# Patient Record
Sex: MALE | Race: WHITE | Employment: FULL TIME | URBAN - METROPOLITAN AREA
[De-identification: names, ages, dates, MRNs, and addresses within clinical notes are randomized per-mention and may not be internally consistent; named-entity substitution may affect disease eponyms.]

---

## 2018-08-30 ENCOUNTER — LAB ENCOUNTER (OUTPATIENT)
Dept: LAB | Age: 53
End: 2018-08-30
Attending: INTERNAL MEDICINE
Payer: COMMERCIAL

## 2018-08-30 ENCOUNTER — OFFICE VISIT (OUTPATIENT)
Dept: INTERNAL MEDICINE CLINIC | Facility: CLINIC | Age: 53
End: 2018-08-30
Payer: COMMERCIAL

## 2018-08-30 VITALS
BODY MASS INDEX: 28.04 KG/M2 | DIASTOLIC BLOOD PRESSURE: 82 MMHG | SYSTOLIC BLOOD PRESSURE: 110 MMHG | HEART RATE: 61 BPM | WEIGHT: 185 LBS | RESPIRATION RATE: 16 BRPM | TEMPERATURE: 99 F | HEIGHT: 68 IN

## 2018-08-30 DIAGNOSIS — Z13.220 LIPID SCREENING: ICD-10-CM

## 2018-08-30 DIAGNOSIS — Z01.84 IMMUNITY STATUS TESTING: ICD-10-CM

## 2018-08-30 DIAGNOSIS — Z13.29 THYROID DISORDER SCREEN: ICD-10-CM

## 2018-08-30 DIAGNOSIS — Z12.5 PROSTATE CANCER SCREENING: ICD-10-CM

## 2018-08-30 DIAGNOSIS — Z12.11 COLON CANCER SCREENING: ICD-10-CM

## 2018-08-30 DIAGNOSIS — H53.9 VISION CHANGES: ICD-10-CM

## 2018-08-30 DIAGNOSIS — Z13.0 SCREENING, ANEMIA, DEFICIENCY, IRON: ICD-10-CM

## 2018-08-30 DIAGNOSIS — M26.629 TMJ SYNDROME: ICD-10-CM

## 2018-08-30 DIAGNOSIS — Z13.89 SCREENING FOR GENITOURINARY CONDITION: ICD-10-CM

## 2018-08-30 DIAGNOSIS — Z00.00 LABORATORY EXAMINATION ORDERED AS PART OF A ROUTINE GENERAL MEDICAL EXAMINATION: ICD-10-CM

## 2018-08-30 DIAGNOSIS — Z00.00 PHYSICAL EXAM, ANNUAL: Primary | ICD-10-CM

## 2018-08-30 PROBLEM — M50.20 CERVICAL DISC HERNIATION: Status: ACTIVE | Noted: 2018-08-30

## 2018-08-30 LAB
ALBUMIN SERPL-MCNC: 4 G/DL (ref 3.5–4.8)
ALBUMIN/GLOB SERPL: 1.1 {RATIO} (ref 1–2)
ALP LIVER SERPL-CCNC: 66 U/L (ref 45–117)
ALT SERPL-CCNC: 21 U/L (ref 17–63)
ANION GAP SERPL CALC-SCNC: 4 MMOL/L (ref 0–18)
AST SERPL-CCNC: 15 U/L (ref 15–41)
BASOPHILS # BLD AUTO: 0.06 X10(3) UL (ref 0–0.1)
BASOPHILS NFR BLD AUTO: 0.8 %
BILIRUB SERPL-MCNC: 1.4 MG/DL (ref 0.1–2)
BUN BLD-MCNC: 13 MG/DL (ref 8–20)
BUN/CREAT SERPL: 13.1 (ref 10–20)
CALCIUM BLD-MCNC: 8.8 MG/DL (ref 8.3–10.3)
CHLORIDE SERPL-SCNC: 106 MMOL/L (ref 101–111)
CHOLEST SMN-MCNC: 191 MG/DL (ref ?–200)
CO2 SERPL-SCNC: 30 MMOL/L (ref 22–32)
COMPLEXED PSA SERPL-MCNC: 0.26 NG/ML (ref 0.01–4)
CREAT BLD-MCNC: 0.99 MG/DL (ref 0.7–1.3)
EOSINOPHIL # BLD AUTO: 0.29 X10(3) UL (ref 0–0.3)
EOSINOPHIL NFR BLD AUTO: 4.1 %
ERYTHROCYTE [DISTWIDTH] IN BLOOD BY AUTOMATED COUNT: 13 % (ref 11.5–16)
GLOBULIN PLAS-MCNC: 3.7 G/DL (ref 2.5–4)
GLUCOSE BLD-MCNC: 79 MG/DL (ref 70–99)
HCT VFR BLD AUTO: 46.4 % (ref 37–53)
HCV AB SERPL QL IA: NONREACTIVE
HDLC SERPL-MCNC: 37 MG/DL (ref 40–59)
HGB BLD-MCNC: 15.8 G/DL (ref 13–17)
IMMATURE GRANULOCYTE COUNT: 0.02 X10(3) UL (ref 0–1)
IMMATURE GRANULOCYTE RATIO %: 0.3 %
LDLC SERPL CALC-MCNC: 109 MG/DL (ref ?–100)
LYMPHOCYTES # BLD AUTO: 2.43 X10(3) UL (ref 0.9–4)
LYMPHOCYTES NFR BLD AUTO: 34.2 %
M PROTEIN MFR SERPL ELPH: 7.7 G/DL (ref 6.1–8.3)
MCH RBC QN AUTO: 29.4 PG (ref 27–33.2)
MCHC RBC AUTO-ENTMCNC: 34.1 G/DL (ref 31–37)
MCV RBC AUTO: 86.2 FL (ref 80–99)
MONOCYTES # BLD AUTO: 0.47 X10(3) UL (ref 0.1–1)
MONOCYTES NFR BLD AUTO: 6.6 %
NEUTROPHIL ABS PRELIM: 3.84 X10 (3) UL (ref 1.3–6.7)
NEUTROPHILS # BLD AUTO: 3.84 X10(3) UL (ref 1.3–6.7)
NEUTROPHILS NFR BLD AUTO: 54 %
NONHDLC SERPL-MCNC: 154 MG/DL (ref ?–130)
OSMOLALITY SERPL CALC.SUM OF ELEC: 289 MOSM/KG (ref 275–295)
PLATELET # BLD AUTO: 293 10(3)UL (ref 150–450)
POTASSIUM SERPL-SCNC: 3.7 MMOL/L (ref 3.6–5.1)
RBC # BLD AUTO: 5.38 X10(6)UL (ref 4.3–5.7)
RED CELL DISTRIBUTION WIDTH-SD: 40.2 FL (ref 35.1–46.3)
SODIUM SERPL-SCNC: 140 MMOL/L (ref 136–144)
TRIGL SERPL-MCNC: 224 MG/DL (ref 30–149)
TSI SER-ACNC: 1.21 MIU/ML (ref 0.35–5.5)
VLDLC SERPL CALC-MCNC: 45 MG/DL (ref 0–30)
WBC # BLD AUTO: 7.1 X10(3) UL (ref 4–13)

## 2018-08-30 PROCEDURE — 83036 HEMOGLOBIN GLYCOSYLATED A1C: CPT | Performed by: INTERNAL MEDICINE

## 2018-08-30 PROCEDURE — 80061 LIPID PANEL: CPT | Performed by: INTERNAL MEDICINE

## 2018-08-30 PROCEDURE — 99396 PREV VISIT EST AGE 40-64: CPT | Performed by: INTERNAL MEDICINE

## 2018-08-30 PROCEDURE — 36415 COLL VENOUS BLD VENIPUNCTURE: CPT | Performed by: INTERNAL MEDICINE

## 2018-08-30 PROCEDURE — 84153 ASSAY OF PSA TOTAL: CPT | Performed by: INTERNAL MEDICINE

## 2018-08-30 PROCEDURE — 80050 GENERAL HEALTH PANEL: CPT | Performed by: INTERNAL MEDICINE

## 2018-08-30 PROCEDURE — 86803 HEPATITIS C AB TEST: CPT | Performed by: INTERNAL MEDICINE

## 2018-08-30 PROCEDURE — 81003 URINALYSIS AUTO W/O SCOPE: CPT | Performed by: INTERNAL MEDICINE

## 2018-08-30 RX ORDER — SIMVASTATIN 20 MG
20 TABLET ORAL NIGHTLY
COMMUNITY

## 2018-08-30 NOTE — PATIENT INSTRUCTIONS
Prevention Guidelines, Men Ages 48 to 59  Screening tests and vaccines are an important part of managing your health. A screening test is done to find possible disorders or diseases in people who don't have any symptoms.  The goal is to find a disease ear High cholesterol or triglycerides All men in this age group At least every 5 years   HIV Men at increased risk for infection – talk with your healthcare provider At routine exams   Lung cancer Adults age 54 to [de-identified] who have smoked Yearly screening in smokers Pneumococcal conjugate vaccine (PCV13) and pneumococcal polysaccharide vaccine (PPSV23) Men at increased risk for infection – talk with your healthcare provider PCV13: 1 dose ages 23 to 72 (protects against 13 types of pneumococcal bacteria)     PPSV23: 1 Admission

## 2018-08-30 NOTE — PROGRESS NOTES
HPI:    Patient ID: Sole Banks is a 47year old male. HPI  Sole Banks is a 47year old male who presents for a complete physical exam.   HPI:   Pt complains of right jaw pain x 3-4 m.  Went to dentist thinking wisdom tooth impac mouth every morning before breakfast. As needed  Disp:  Rfl:    LORazepam (ATIVAN) 2 MG Oral Tab Take 1 tablet by mouth 3 (three) times daily as needed for Anxiety. Disp: 90 tablet Rfl: 1   Finasteride 1 MG Oral Tab Take 1 mg by mouth daily.  Disp:  Rfl: distress  SKIN: no rashes,no suspicious lesions  HEENT: atraumatic, normocephalic,ears and throat are clear, tender and swelling at right tmj, no mass  EYES:PERRLA, EOMI, normal optic disk,conjunctiva are clear  NECK: supple,no adenopathy,no bruits  CHEST: Disp:  Rfl:    LORazepam (ATIVAN) 2 MG Oral Tab Take 1 tablet by mouth 3 (three) times daily as needed for Anxiety. Disp: 90 tablet Rfl: 1   Finasteride 1 MG Oral Tab Take 1 mg by mouth daily.  Disp:  Rfl:    aspirin 81 MG Oral Tab Take 1 tablet by mouth da

## 2018-08-31 ENCOUNTER — NURSE ONLY (OUTPATIENT)
Dept: INTERNAL MEDICINE CLINIC | Facility: CLINIC | Age: 53
End: 2018-08-31

## 2018-08-31 LAB
BILIRUB UR QL STRIP.AUTO: NEGATIVE
COLOR UR AUTO: YELLOW
EST. AVERAGE GLUCOSE BLD GHB EST-MCNC: 100 MG/DL (ref 68–126)
GLUCOSE UR STRIP.AUTO-MCNC: NEGATIVE MG/DL
HBA1C MFR BLD HPLC: 5.1 % (ref ?–5.7)
KETONES UR STRIP.AUTO-MCNC: NEGATIVE MG/DL
LEUKOCYTE ESTERASE UR QL STRIP.AUTO: NEGATIVE
NITRITE UR QL STRIP.AUTO: NEGATIVE
PH UR STRIP.AUTO: 5 [PH] (ref 4.5–8)
PROT UR STRIP.AUTO-MCNC: NEGATIVE MG/DL
RBC UR QL AUTO: NEGATIVE
SP GR UR STRIP.AUTO: 1.02 (ref 1–1.03)
UROBILINOGEN UR STRIP.AUTO-MCNC: <2 MG/DL

## 2018-08-31 NOTE — PROGRESS NOTES
Bilateral ear irrigation successfully performed. Pt tolerated irrigation well. A copy of the pt's Wellness Screening form was given to the pt upon request. A copy was sent to scan.

## 2018-11-02 PROBLEM — R14.1 FLATULENCE, ERUCTATION, AND GAS PAIN: Status: ACTIVE | Noted: 2018-11-02

## 2018-11-02 PROBLEM — Z80.0 FAMILY HISTORY OF MALIGNANT NEOPLASM OF GASTROINTESTINAL TRACT: Status: ACTIVE | Noted: 2018-11-02

## 2018-11-02 PROBLEM — Z86.0100 PERSONAL HISTORY OF COLONIC POLYPS: Status: ACTIVE | Noted: 2018-11-02

## 2018-11-02 PROBLEM — R14.3 FLATULENCE, ERUCTATION, AND GAS PAIN: Status: ACTIVE | Noted: 2018-11-02

## 2018-11-02 PROBLEM — R12 HEARTBURN: Status: ACTIVE | Noted: 2018-11-02

## 2018-11-02 PROBLEM — D12.2 BENIGN NEOPLASM OF ASCENDING COLON: Status: ACTIVE | Noted: 2018-11-02

## 2018-11-02 PROBLEM — Z83.71 FAMILY HISTORY OF COLONIC POLYPS: Status: ACTIVE | Noted: 2018-11-02

## 2018-11-02 PROBLEM — R14.2 FLATULENCE, ERUCTATION, AND GAS PAIN: Status: ACTIVE | Noted: 2018-11-02

## 2018-11-02 PROBLEM — Z83.719 FAMILY HISTORY OF COLONIC POLYPS: Status: ACTIVE | Noted: 2018-11-02

## 2018-11-02 PROBLEM — Z86.010 PERSONAL HISTORY OF COLONIC POLYPS: Status: ACTIVE | Noted: 2018-11-02

## 2019-04-03 ENCOUNTER — OFFICE VISIT (OUTPATIENT)
Dept: INTERNAL MEDICINE CLINIC | Facility: CLINIC | Age: 54
End: 2019-04-03
Payer: COMMERCIAL

## 2019-04-03 VITALS
WEIGHT: 192 LBS | HEIGHT: 68 IN | HEART RATE: 74 BPM | SYSTOLIC BLOOD PRESSURE: 112 MMHG | TEMPERATURE: 98 F | DIASTOLIC BLOOD PRESSURE: 70 MMHG | BODY MASS INDEX: 29.1 KG/M2 | RESPIRATION RATE: 16 BRPM

## 2019-04-03 DIAGNOSIS — H81.11 BPPV (BENIGN PAROXYSMAL POSITIONAL VERTIGO), RIGHT: Primary | ICD-10-CM

## 2019-04-03 PROCEDURE — 99213 OFFICE O/P EST LOW 20 MIN: CPT | Performed by: PHYSICIAN ASSISTANT

## 2019-04-03 RX ORDER — PREDNISONE 20 MG/1
20 TABLET ORAL DAILY
Qty: 7 TABLET | Refills: 0 | Status: SHIPPED | OUTPATIENT
Start: 2019-04-03 | End: 2019-04-10

## 2019-04-03 RX ORDER — MECLIZINE HYDROCHLORIDE 25 MG/1
25 TABLET ORAL 3 TIMES DAILY PRN
Qty: 21 TABLET | Refills: 0 | Status: SHIPPED | OUTPATIENT
Start: 2019-04-03 | End: 2019-09-05 | Stop reason: ALTCHOICE

## 2019-04-03 NOTE — PROGRESS NOTES
HPI:    Patient ID: Abbi Hart is a 47year old male. Patient presents with:  Dizziness: started last thursday, dizziness, nausea, pt states he only feels dizzy at ngiht while he is laying down     Dizziness   This is a new problem.  The vonda 3350-KCl-NaBcb-NaCl-NaSulf (PEG 3350/ELECTROLYTES) 240 g Oral Recon Soln Take as directed by physician. Disp: 1 Bottle Rfl: 0   simvastatin 20 MG Oral Tab Take 20 mg by mouth nightly.  Disp:  Rfl:    Multiple Vitamins-Minerals (CENTRUM SILVER ULTRA MENS) Or He is not diaphoretic. Psychiatric: He has a normal mood and affect.        /78   Pulse 74   Temp 98.4 °F (36.9 °C) (Oral)   Resp 16   Ht 68\"   Wt 192 lb   BMI 29.19 kg/m²           ASSESSMENT/PLAN:   Bppv (benign paroxysmal positional vertigo), ri

## 2019-04-03 NOTE — PATIENT INSTRUCTIONS
HOME TREATMENT OF BPPV:  HADLEY-DAROFF EXERCISES    The Hadley-Daroff Exercises are a home method of treating BPPV, usually used when the side of BPPV is unclear.  Their use has been declining in recent years, as the home Epley maneuver (see below) is c These exercises should be performed for two weeks, three times per day, or for three weeks, twice per day. This adds up to 42 sets in total. In most persons, complete relief from symptoms is obtained after 30 sets, or about 10 days.  In approximately 30 per Causes include injury to your head or neck. Other problems with the vestibular system may cause BPPV. In many people, the cause of BPPV is not known. Symptoms of BPPV  You many have repeated feelings of spinning (vertigo).  The vertigo usually lasts less t © 4594-5989 [employer] [address]. [address] [facility] [place], [facility] [place]. All rights reserved. This information is not intended as a substitute for professional medical care. Always follow your healthcare professional's instructions.

## 2019-09-05 ENCOUNTER — APPOINTMENT (OUTPATIENT)
Dept: LAB | Age: 54
End: 2019-09-05
Attending: INTERNAL MEDICINE
Payer: COMMERCIAL

## 2019-09-05 ENCOUNTER — OFFICE VISIT (OUTPATIENT)
Dept: INTERNAL MEDICINE CLINIC | Facility: CLINIC | Age: 54
End: 2019-09-05
Payer: COMMERCIAL

## 2019-09-05 VITALS
RESPIRATION RATE: 16 BRPM | WEIGHT: 194 LBS | DIASTOLIC BLOOD PRESSURE: 82 MMHG | HEIGHT: 68 IN | SYSTOLIC BLOOD PRESSURE: 134 MMHG | TEMPERATURE: 98 F | BODY MASS INDEX: 29.4 KG/M2 | HEART RATE: 92 BPM

## 2019-09-05 DIAGNOSIS — Z00.00 ANNUAL PHYSICAL EXAM: Primary | ICD-10-CM

## 2019-09-05 DIAGNOSIS — Z13.89 SCREENING FOR GENITOURINARY CONDITION: ICD-10-CM

## 2019-09-05 DIAGNOSIS — Z13.0 SCREENING, IRON DEFICIENCY ANEMIA: ICD-10-CM

## 2019-09-05 DIAGNOSIS — M77.11 LATERAL EPICONDYLITIS OF RIGHT ELBOW: ICD-10-CM

## 2019-09-05 DIAGNOSIS — Z00.00 LABORATORY EXAMINATION ORDERED AS PART OF A ROUTINE GENERAL MEDICAL EXAMINATION: ICD-10-CM

## 2019-09-05 DIAGNOSIS — Z13.29 THYROID DISORDER SCREEN: ICD-10-CM

## 2019-09-05 DIAGNOSIS — Z12.5 PROSTATE CANCER SCREENING: ICD-10-CM

## 2019-09-05 DIAGNOSIS — Z13.220 LIPID SCREENING: ICD-10-CM

## 2019-09-05 LAB
ALBUMIN SERPL-MCNC: 4.1 G/DL (ref 3.4–5)
ALBUMIN/GLOB SERPL: 1.2 {RATIO} (ref 1–2)
ALP LIVER SERPL-CCNC: 61 U/L (ref 45–117)
ALT SERPL-CCNC: 24 U/L (ref 16–61)
ANION GAP SERPL CALC-SCNC: 5 MMOL/L (ref 0–18)
AST SERPL-CCNC: 19 U/L (ref 15–37)
BASOPHILS # BLD AUTO: 0.05 X10(3) UL (ref 0–0.2)
BASOPHILS NFR BLD AUTO: 0.7 %
BILIRUB SERPL-MCNC: 1.8 MG/DL (ref 0.1–2)
BILIRUB UR QL STRIP.AUTO: NEGATIVE
BUN BLD-MCNC: 9 MG/DL (ref 7–18)
BUN/CREAT SERPL: 9.1 (ref 10–20)
CALCIUM BLD-MCNC: 8.4 MG/DL (ref 8.5–10.1)
CHLORIDE SERPL-SCNC: 105 MMOL/L (ref 98–112)
CHOLEST SMN-MCNC: 151 MG/DL (ref ?–200)
CLARITY UR REFRACT.AUTO: CLEAR
CO2 SERPL-SCNC: 30 MMOL/L (ref 21–32)
COLOR UR AUTO: YELLOW
CREAT BLD-MCNC: 0.99 MG/DL (ref 0.7–1.3)
DEPRECATED RDW RBC AUTO: 43.4 FL (ref 35.1–46.3)
EOSINOPHIL # BLD AUTO: 0.21 X10(3) UL (ref 0–0.7)
EOSINOPHIL NFR BLD AUTO: 3 %
ERYTHROCYTE [DISTWIDTH] IN BLOOD BY AUTOMATED COUNT: 13.3 % (ref 11–15)
EST. AVERAGE GLUCOSE BLD GHB EST-MCNC: 108 MG/DL (ref 68–126)
GLOBULIN PLAS-MCNC: 3.3 G/DL (ref 2.8–4.4)
GLUCOSE BLD-MCNC: 88 MG/DL (ref 70–99)
GLUCOSE UR STRIP.AUTO-MCNC: NEGATIVE MG/DL
HBA1C MFR BLD HPLC: 5.4 % (ref ?–5.7)
HCT VFR BLD AUTO: 44.9 % (ref 39–53)
HDLC SERPL-MCNC: 38 MG/DL (ref 40–59)
HGB BLD-MCNC: 14.8 G/DL (ref 13–17.5)
IMM GRANULOCYTES # BLD AUTO: 0.02 X10(3) UL (ref 0–1)
IMM GRANULOCYTES NFR BLD: 0.3 %
LDLC SERPL CALC-MCNC: 83 MG/DL (ref ?–100)
LEUKOCYTE ESTERASE UR QL STRIP.AUTO: NEGATIVE
LYMPHOCYTES # BLD AUTO: 1.8 X10(3) UL (ref 1–4)
LYMPHOCYTES NFR BLD AUTO: 25.8 %
M PROTEIN MFR SERPL ELPH: 7.4 G/DL (ref 6.4–8.2)
MCH RBC QN AUTO: 29.1 PG (ref 26–34)
MCHC RBC AUTO-ENTMCNC: 33 G/DL (ref 31–37)
MCV RBC AUTO: 88.4 FL (ref 80–100)
MONOCYTES # BLD AUTO: 0.46 X10(3) UL (ref 0.1–1)
MONOCYTES NFR BLD AUTO: 6.6 %
NEUTROPHILS # BLD AUTO: 4.44 X10 (3) UL (ref 1.5–7.7)
NEUTROPHILS # BLD AUTO: 4.44 X10(3) UL (ref 1.5–7.7)
NEUTROPHILS NFR BLD AUTO: 63.6 %
NITRITE UR QL STRIP.AUTO: NEGATIVE
NONHDLC SERPL-MCNC: 113 MG/DL (ref ?–130)
OSMOLALITY SERPL CALC.SUM OF ELEC: 288 MOSM/KG (ref 275–295)
PH UR STRIP.AUTO: 6 [PH] (ref 4.5–8)
PLATELET # BLD AUTO: 323 10(3)UL (ref 150–450)
POTASSIUM SERPL-SCNC: 3.9 MMOL/L (ref 3.5–5.1)
PROT UR STRIP.AUTO-MCNC: NEGATIVE MG/DL
PSA SERPL-MCNC: 0.2 NG/ML (ref ?–4)
RBC # BLD AUTO: 5.08 X10(6)UL (ref 4.3–5.7)
RBC UR QL AUTO: NEGATIVE
SODIUM SERPL-SCNC: 140 MMOL/L (ref 136–145)
SP GR UR STRIP.AUTO: 1.02 (ref 1–1.03)
TRIGL SERPL-MCNC: 152 MG/DL (ref 30–149)
TSI SER-ACNC: 1.52 MIU/ML (ref 0.36–3.74)
UROBILINOGEN UR STRIP.AUTO-MCNC: <2 MG/DL
VLDLC SERPL CALC-MCNC: 30 MG/DL (ref 0–30)
WBC # BLD AUTO: 7 X10(3) UL (ref 4–11)

## 2019-09-05 PROCEDURE — 85025 COMPLETE CBC W/AUTO DIFF WBC: CPT | Performed by: INTERNAL MEDICINE

## 2019-09-05 PROCEDURE — 99396 PREV VISIT EST AGE 40-64: CPT | Performed by: INTERNAL MEDICINE

## 2019-09-05 PROCEDURE — 84443 ASSAY THYROID STIM HORMONE: CPT | Performed by: INTERNAL MEDICINE

## 2019-09-05 PROCEDURE — 80061 LIPID PANEL: CPT | Performed by: INTERNAL MEDICINE

## 2019-09-05 PROCEDURE — 36415 COLL VENOUS BLD VENIPUNCTURE: CPT | Performed by: INTERNAL MEDICINE

## 2019-09-05 PROCEDURE — 81003 URINALYSIS AUTO W/O SCOPE: CPT | Performed by: INTERNAL MEDICINE

## 2019-09-05 PROCEDURE — 83036 HEMOGLOBIN GLYCOSYLATED A1C: CPT | Performed by: INTERNAL MEDICINE

## 2019-09-05 PROCEDURE — 80053 COMPREHEN METABOLIC PANEL: CPT | Performed by: INTERNAL MEDICINE

## 2019-09-05 PROCEDURE — 84153 ASSAY OF PSA TOTAL: CPT | Performed by: INTERNAL MEDICINE

## 2019-09-05 NOTE — PROGRESS NOTES
HPI:    Patient ID: Bladimir Ivory is a 49year old male. HPI  Bladimir Ivory is a 49year old male who presents for a complete physical exam.   HPI:   Pt complains of right lateral elbow for months  No trauma.  Worse with lifting  Othe Release Take 20 mg by mouth every morning before breakfast. As needed  Disp:  Rfl:    LORazepam (ATIVAN) 2 MG Oral Tab Take 1 tablet by mouth 3 (three) times daily as needed for Anxiety.  Disp: 90 tablet Rfl: 1   Finasteride 1 MG Oral Tab Take 1 mg by mouth well otherwise  SKIN: denies any unusual skin lesions  EYES:denies blurred vision or double vision  HEENT: denies nasal congestion, sinus pain or ST  LUNGS: denies shortness of breath with exertion  CARDIOVASCULAR: denies chest pain on exertion  GI: denies nightly. Disp:  Rfl:    Multiple Vitamins-Minerals (CENTRUM SILVER ULTRA MENS) Oral Tab Take by mouth. Disp:  Rfl:    Cholecalciferol (VITAMIN D3) 2000 UNITS Oral Tab Take by mouth daily.  Disp:  Rfl:    escitalopram (LEXAPRO) 10 MG Oral Tab Take 1 tablet (

## 2019-09-05 NOTE — PATIENT INSTRUCTIONS
Prevention Guidelines, Men Ages 48 to 59  Screening tests and vaccines are an important part of managing your health. A screening test is done to find possible disorders or diseases in people who don't have any symptoms.  The goal is to find a disease ear High cholesterol or triglycerides All men in this age group At least every 5 years   HIV Men at increased risk for infection – talk with your healthcare provider At routine exams   Lung cancer Adults age 54 to [de-identified] who have smoked Yearly screening in smokers Pneumococcal conjugate vaccine (PCV13) and pneumococcal polysaccharide vaccine (PPSV23) Men at increased risk for infection – talk with your healthcare provider PCV13: 1 dose ages 23 to 72 (protects against 13 types of pneumococcal bacteria)     PPSV23: 1 What causes lateral epicondylitis? The condition most often occurs because of overuse.  This can be from any activity that repeatedly puts stress on the forearm extensor muscles or tendons and wrist. For instance, playing tennis, lifting weights, cutting m · Having surgery. This may be an option if other treatments fail to relieve symptoms. In many cases, the surgeon removes the damaged tissue.   Possible complications of lateral epicondylitis  If the tendons involved don’t heal properly, symptoms may return Your healthcare provider may give you injections of an anti-inflammatory medicine, such as cortisone. This helps reduce swelling. You may have more pain at first. But in a few days, your elbow should feel better.   If surgery is needed  If your symptoms per

## 2020-05-27 ENCOUNTER — OFFICE VISIT (OUTPATIENT)
Dept: INTERNAL MEDICINE CLINIC | Facility: CLINIC | Age: 55
End: 2020-05-27
Payer: COMMERCIAL

## 2020-05-27 ENCOUNTER — HOSPITAL ENCOUNTER (OUTPATIENT)
Dept: CT IMAGING | Age: 55
Discharge: HOME | End: 2020-05-27
Attending: NURSE PRACTITIONER
Payer: COMMERCIAL

## 2020-05-27 VITALS
BODY MASS INDEX: 29.44 KG/M2 | OXYGEN SATURATION: 97 % | HEART RATE: 107 BPM | RESPIRATION RATE: 18 BRPM | SYSTOLIC BLOOD PRESSURE: 124 MMHG | HEIGHT: 68 IN | WEIGHT: 194.25 LBS | DIASTOLIC BLOOD PRESSURE: 86 MMHG | TEMPERATURE: 100 F

## 2020-05-27 DIAGNOSIS — K57.92 DIVERTICULITIS: ICD-10-CM

## 2020-05-27 DIAGNOSIS — R10.32 LLQ ABDOMINAL PAIN: ICD-10-CM

## 2020-05-27 DIAGNOSIS — R10.32 LLQ ABDOMINAL PAIN: Primary | ICD-10-CM

## 2020-05-27 PROCEDURE — 74177 CT ABD & PELVIS W/CONTRAST: CPT | Performed by: NURSE PRACTITIONER

## 2020-05-27 PROCEDURE — 99214 OFFICE O/P EST MOD 30 MIN: CPT | Performed by: NURSE PRACTITIONER

## 2020-05-27 PROCEDURE — 82565 ASSAY OF CREATININE: CPT

## 2020-05-27 RX ORDER — METRONIDAZOLE 500 MG/1
500 TABLET ORAL 3 TIMES DAILY
Qty: 30 TABLET | Refills: 0 | Status: SHIPPED | OUTPATIENT
Start: 2020-05-27 | End: 2020-06-06

## 2020-05-27 RX ORDER — CIPROFLOXACIN 500 MG/1
500 TABLET, FILM COATED ORAL 2 TIMES DAILY
Qty: 20 TABLET | Refills: 0 | Status: SHIPPED | OUTPATIENT
Start: 2020-05-27 | End: 2020-09-10

## 2020-05-28 NOTE — PROGRESS NOTES
HPI:    Patient ID: Huang Sanchez is a 48year old male. Patient presents with: Other: pt c/o left \"sharp rib pain, like a knife\" onset 5/25---worse yesterday.  pt pointed in two spots left side lower rib cage      Pain started yesterday, Re Age of Onset   • Bleeding Disorders Father    • Asthma Mother    • Colon Polyps Mother    • Other (Other) Daughter         add   • Asthma Son    • Other (Other) Son         add   • Asthma Paternal Grandfather    • Bipolar Disorder Sister    • Psychiatric S mouth daily.   0     Allergies:No Known Allergies    Lab Results   Component Value Date    GLU 88 09/05/2019    BUN 9 09/05/2019    BUNCREA 9.1 (L) 09/05/2019    CREATSERUM 0.99 09/05/2019    ANIONGAP 5 09/05/2019    GFR 91 08/01/2016    GFRNAA 87 09/05/201 Abdominal: Soft. Normal appearance. He exhibits no distension and no mass. Bowel sounds are increased. There is tenderness in the left upper quadrant and left lower quadrant. There is no rebound and no guarding. No hernia.             ASSESSMENT/PLAN:   D

## 2020-06-05 PROBLEM — R93.3 ABNORMAL FINDING ON GI TRACT IMAGING: Status: ACTIVE | Noted: 2020-06-05

## 2020-07-08 ENCOUNTER — LAB ENCOUNTER (OUTPATIENT)
Dept: LAB | Facility: HOSPITAL | Age: 55
End: 2020-07-08
Attending: INTERNAL MEDICINE
Payer: COMMERCIAL

## 2020-07-08 DIAGNOSIS — Z01.818 PRE-OP TESTING: ICD-10-CM

## 2020-07-10 PROBLEM — K57.92 DIVERTICULITIS: Status: ACTIVE | Noted: 2020-07-10

## 2020-07-10 PROBLEM — R93.5 ABNORMAL CT OF THE ABDOMEN: Status: ACTIVE | Noted: 2020-07-10

## 2020-07-10 LAB — SARS-COV-2 RNA RESP QL NAA+PROBE: NOT DETECTED

## 2020-09-10 ENCOUNTER — APPOINTMENT (OUTPATIENT)
Dept: LAB | Age: 55
End: 2020-09-10
Attending: INTERNAL MEDICINE
Payer: COMMERCIAL

## 2020-09-10 ENCOUNTER — OFFICE VISIT (OUTPATIENT)
Dept: INTERNAL MEDICINE CLINIC | Facility: CLINIC | Age: 55
End: 2020-09-10
Payer: COMMERCIAL

## 2020-09-10 VITALS
BODY MASS INDEX: 29.7 KG/M2 | WEIGHT: 196 LBS | DIASTOLIC BLOOD PRESSURE: 80 MMHG | HEIGHT: 68 IN | RESPIRATION RATE: 16 BRPM | TEMPERATURE: 99 F | SYSTOLIC BLOOD PRESSURE: 124 MMHG | OXYGEN SATURATION: 98 % | HEART RATE: 78 BPM

## 2020-09-10 DIAGNOSIS — Z12.5 PROSTATE CANCER SCREENING: ICD-10-CM

## 2020-09-10 DIAGNOSIS — Z13.29 THYROID DISORDER SCREEN: ICD-10-CM

## 2020-09-10 DIAGNOSIS — Z13.0 SCREENING, IRON DEFICIENCY ANEMIA: ICD-10-CM

## 2020-09-10 DIAGNOSIS — Z00.00 ANNUAL PHYSICAL EXAM: Primary | ICD-10-CM

## 2020-09-10 DIAGNOSIS — Z13.220 LIPID SCREENING: ICD-10-CM

## 2020-09-10 DIAGNOSIS — Z13.89 SCREENING FOR GENITOURINARY CONDITION: ICD-10-CM

## 2020-09-10 DIAGNOSIS — Z00.00 LABORATORY EXAMINATION ORDERED AS PART OF A ROUTINE GENERAL MEDICAL EXAMINATION: ICD-10-CM

## 2020-09-10 PROBLEM — R93.3 ABNORMAL FINDING ON GI TRACT IMAGING: Status: RESOLVED | Noted: 2020-06-05 | Resolved: 2020-09-10

## 2020-09-10 PROBLEM — K57.92 DIVERTICULITIS: Status: RESOLVED | Noted: 2020-07-10 | Resolved: 2020-09-10

## 2020-09-10 PROBLEM — D12.2 BENIGN NEOPLASM OF ASCENDING COLON: Status: RESOLVED | Noted: 2018-11-02 | Resolved: 2020-09-10

## 2020-09-10 PROBLEM — Z86.0100 PERSONAL HISTORY OF COLONIC POLYPS: Status: RESOLVED | Noted: 2018-11-02 | Resolved: 2020-09-10

## 2020-09-10 PROBLEM — R12 HEARTBURN: Status: RESOLVED | Noted: 2018-11-02 | Resolved: 2020-09-10

## 2020-09-10 PROBLEM — Z80.0 FAMILY HISTORY OF MALIGNANT NEOPLASM OF GASTROINTESTINAL TRACT: Status: RESOLVED | Noted: 2018-11-02 | Resolved: 2020-09-10

## 2020-09-10 PROBLEM — R14.2 FLATULENCE, ERUCTATION, AND GAS PAIN: Status: RESOLVED | Noted: 2018-11-02 | Resolved: 2020-09-10

## 2020-09-10 PROBLEM — R14.1 FLATULENCE, ERUCTATION, AND GAS PAIN: Status: RESOLVED | Noted: 2018-11-02 | Resolved: 2020-09-10

## 2020-09-10 PROBLEM — Z86.010 PERSONAL HISTORY OF COLONIC POLYPS: Status: RESOLVED | Noted: 2018-11-02 | Resolved: 2020-09-10

## 2020-09-10 PROBLEM — R93.5 ABNORMAL CT OF THE ABDOMEN: Status: RESOLVED | Noted: 2020-07-10 | Resolved: 2020-09-10

## 2020-09-10 PROBLEM — R14.3 FLATULENCE, ERUCTATION, AND GAS PAIN: Status: RESOLVED | Noted: 2018-11-02 | Resolved: 2020-09-10

## 2020-09-10 LAB
ALBUMIN SERPL-MCNC: 3.9 G/DL (ref 3.4–5)
ALBUMIN/GLOB SERPL: 1.1 {RATIO} (ref 1–2)
ALP LIVER SERPL-CCNC: 61 U/L (ref 45–117)
ALT SERPL-CCNC: 22 U/L (ref 16–61)
ANION GAP SERPL CALC-SCNC: 4 MMOL/L (ref 0–18)
AST SERPL-CCNC: 12 U/L (ref 15–37)
BASOPHILS # BLD AUTO: 0.06 X10(3) UL (ref 0–0.2)
BASOPHILS NFR BLD AUTO: 0.8 %
BILIRUB SERPL-MCNC: 1.9 MG/DL (ref 0.1–2)
BILIRUB UR QL STRIP.AUTO: NEGATIVE
BUN BLD-MCNC: 15 MG/DL (ref 7–18)
BUN/CREAT SERPL: 14.7 (ref 10–20)
CALCIUM BLD-MCNC: 9.3 MG/DL (ref 8.5–10.1)
CHLORIDE SERPL-SCNC: 105 MMOL/L (ref 98–112)
CHOLEST SMN-MCNC: 198 MG/DL (ref ?–200)
CLARITY UR REFRACT.AUTO: CLEAR
CO2 SERPL-SCNC: 29 MMOL/L (ref 21–32)
COLOR UR AUTO: YELLOW
CREAT BLD-MCNC: 1.02 MG/DL (ref 0.7–1.3)
DEPRECATED RDW RBC AUTO: 42.6 FL (ref 35.1–46.3)
EOSINOPHIL # BLD AUTO: 0.12 X10(3) UL (ref 0–0.7)
EOSINOPHIL NFR BLD AUTO: 1.7 %
ERYTHROCYTE [DISTWIDTH] IN BLOOD BY AUTOMATED COUNT: 13.3 % (ref 11–15)
EST. AVERAGE GLUCOSE BLD GHB EST-MCNC: 103 MG/DL (ref 68–126)
GLOBULIN PLAS-MCNC: 3.5 G/DL (ref 2.8–4.4)
GLUCOSE BLD-MCNC: 92 MG/DL (ref 70–99)
GLUCOSE UR STRIP.AUTO-MCNC: NEGATIVE MG/DL
HBA1C MFR BLD HPLC: 5.2 % (ref ?–5.7)
HCT VFR BLD AUTO: 50.7 % (ref 39–53)
HDLC SERPL-MCNC: 42 MG/DL (ref 40–59)
HGB BLD-MCNC: 16.6 G/DL (ref 13–17.5)
HYALINE CASTS #/AREA URNS AUTO: PRESENT /LPF
IMM GRANULOCYTES # BLD AUTO: 0.02 X10(3) UL (ref 0–1)
IMM GRANULOCYTES NFR BLD: 0.3 %
KETONES UR STRIP.AUTO-MCNC: NEGATIVE MG/DL
LDLC SERPL CALC-MCNC: 124 MG/DL (ref ?–100)
LEUKOCYTE ESTERASE UR QL STRIP.AUTO: NEGATIVE
LYMPHOCYTES # BLD AUTO: 1.88 X10(3) UL (ref 1–4)
LYMPHOCYTES NFR BLD AUTO: 26.6 %
M PROTEIN MFR SERPL ELPH: 7.4 G/DL (ref 6.4–8.2)
MCH RBC QN AUTO: 28.9 PG (ref 26–34)
MCHC RBC AUTO-ENTMCNC: 32.7 G/DL (ref 31–37)
MCV RBC AUTO: 88.2 FL (ref 80–100)
MONOCYTES # BLD AUTO: 0.39 X10(3) UL (ref 0.1–1)
MONOCYTES NFR BLD AUTO: 5.5 %
NEUTROPHILS # BLD AUTO: 4.61 X10 (3) UL (ref 1.5–7.7)
NEUTROPHILS # BLD AUTO: 4.61 X10(3) UL (ref 1.5–7.7)
NEUTROPHILS NFR BLD AUTO: 65.1 %
NITRITE UR QL STRIP.AUTO: NEGATIVE
NONHDLC SERPL-MCNC: 156 MG/DL (ref ?–130)
OSMOLALITY SERPL CALC.SUM OF ELEC: 286 MOSM/KG (ref 275–295)
PATIENT FASTING Y/N/NP: YES
PATIENT FASTING Y/N/NP: YES
PH UR STRIP.AUTO: 5 [PH] (ref 4.5–8)
PLATELET # BLD AUTO: 325 10(3)UL (ref 150–450)
POTASSIUM SERPL-SCNC: 4.2 MMOL/L (ref 3.5–5.1)
PROT UR STRIP.AUTO-MCNC: NEGATIVE MG/DL
PSA SERPL-MCNC: 0.2 NG/ML (ref ?–4)
RBC # BLD AUTO: 5.75 X10(6)UL (ref 4.3–5.7)
SODIUM SERPL-SCNC: 138 MMOL/L (ref 136–145)
SP GR UR STRIP.AUTO: 1.02 (ref 1–1.03)
TRIGL SERPL-MCNC: 158 MG/DL (ref 30–149)
TSI SER-ACNC: 1.23 MIU/ML (ref 0.36–3.74)
UROBILINOGEN UR STRIP.AUTO-MCNC: <2 MG/DL
VLDLC SERPL CALC-MCNC: 32 MG/DL (ref 0–30)
WBC # BLD AUTO: 7.1 X10(3) UL (ref 4–11)

## 2020-09-10 PROCEDURE — 80061 LIPID PANEL: CPT | Performed by: INTERNAL MEDICINE

## 2020-09-10 PROCEDURE — 36415 COLL VENOUS BLD VENIPUNCTURE: CPT | Performed by: INTERNAL MEDICINE

## 2020-09-10 PROCEDURE — 80053 COMPREHEN METABOLIC PANEL: CPT | Performed by: INTERNAL MEDICINE

## 2020-09-10 PROCEDURE — 99396 PREV VISIT EST AGE 40-64: CPT | Performed by: INTERNAL MEDICINE

## 2020-09-10 PROCEDURE — 3008F BODY MASS INDEX DOCD: CPT | Performed by: INTERNAL MEDICINE

## 2020-09-10 PROCEDURE — 81001 URINALYSIS AUTO W/SCOPE: CPT | Performed by: INTERNAL MEDICINE

## 2020-09-10 PROCEDURE — 83036 HEMOGLOBIN GLYCOSYLATED A1C: CPT | Performed by: INTERNAL MEDICINE

## 2020-09-10 PROCEDURE — 85025 COMPLETE CBC W/AUTO DIFF WBC: CPT | Performed by: INTERNAL MEDICINE

## 2020-09-10 PROCEDURE — 84443 ASSAY THYROID STIM HORMONE: CPT | Performed by: INTERNAL MEDICINE

## 2020-09-10 PROCEDURE — 84153 ASSAY OF PSA TOTAL: CPT | Performed by: INTERNAL MEDICINE

## 2020-09-10 PROCEDURE — 3079F DIAST BP 80-89 MM HG: CPT | Performed by: INTERNAL MEDICINE

## 2020-09-10 PROCEDURE — 3074F SYST BP LT 130 MM HG: CPT | Performed by: INTERNAL MEDICINE

## 2020-09-10 RX ORDER — ESCITALOPRAM OXALATE 10 MG/1
TABLET ORAL
COMMUNITY
Start: 2019-08-26

## 2020-09-10 NOTE — PROGRESS NOTES
HPI:    Patient ID: Geri Lugo is a 48year old male. HPI  Geri Lugo is a 48year old male who presents for a complete physical exam.   HPI:   Pt complains of nothing  Depression is stable.      PAST MEDICAL, SOCIAL, FAMILY HIS every morning before breakfast. As needed      • LORazepam (ATIVAN) 2 MG Oral Tab Take 1 tablet by mouth 3 (three) times daily as needed for Anxiety. 90 tablet 1   • Finasteride 1 MG Oral Tab Take 1 mg by mouth daily.      • aspirin 81 MG Oral Tab Take 1 ta vision or double vision  HEENT: denies nasal congestion, sinus pain or ST  LUNGS: denies shortness of breath with exertion  CARDIOVASCULAR: denies chest pain on exertion  GI: denies abdominal pain,denies heartburn  : denies nocturia or changes in stream mg by mouth nightly. • omeprazole (PRILOSEC) 20 MG Oral Capsule Delayed Release Take 20 mg by mouth every morning before breakfast. As needed      • LORazepam (ATIVAN) 2 MG Oral Tab Take 1 tablet by mouth 3 (three) times daily as needed for Anxiety.  80

## 2020-09-10 NOTE — PATIENT INSTRUCTIONS
Prevention Guidelines, Men Ages 48 to 59  Screening tests and vaccines are an important part of managing your health. A screening test is done to find diseases in people who don't have any symptoms.  The goal is to find a disease early so lifestyle change Type 2 diabetes or prediabetes  All men beginning at age 39 and men without symptoms at any age who are overweight or obese and have 1 or more other risk factors for diabetes  At least every 3 years (yearly if your blood sugar has already begun to rise) Ask your healthcare provider if you need glaucoma screening with a dilated eye exam every 2 years(2).     Vaccine Who needs it How often   Chickenpox (varicella)  All men in this age group who have no record of this infection or vaccine  2 doses; second dos PPSV23: 1 to 2 doses through age 59, or 1 dose at 72 or older (protects against 23 types of pneumococcal bacteria)    Tetanus/diphtheria/pertussis (Td/Tdap) booster All men in this age group  Td every 10 years, or a 1-time dose of Tdap instead of a Td christiano © 1632-4411 [employer] [address]. [address] [facility] [place], [facility] [place]. All rights reserved. This information is not intended as a substitute for professional medical care. Always follow your healthcare professional's instructions.

## 2020-12-09 ENCOUNTER — HOSPITAL ENCOUNTER (OUTPATIENT)
Dept: GENERAL RADIOLOGY | Age: 55
Discharge: HOME | End: 2020-12-09
Attending: NURSE PRACTITIONER
Payer: COMMERCIAL

## 2020-12-09 ENCOUNTER — OFFICE VISIT (OUTPATIENT)
Dept: INTERNAL MEDICINE CLINIC | Facility: CLINIC | Age: 55
End: 2020-12-09
Payer: COMMERCIAL

## 2020-12-09 VITALS
OXYGEN SATURATION: 99 % | TEMPERATURE: 98 F | HEIGHT: 68 IN | WEIGHT: 201 LBS | BODY MASS INDEX: 30.46 KG/M2 | HEART RATE: 74 BPM | DIASTOLIC BLOOD PRESSURE: 80 MMHG | RESPIRATION RATE: 16 BRPM | SYSTOLIC BLOOD PRESSURE: 128 MMHG

## 2020-12-09 DIAGNOSIS — M25.442 SWELLING OF FINGER JOINT OF LEFT HAND: Primary | ICD-10-CM

## 2020-12-09 DIAGNOSIS — M25.442 SWELLING OF FINGER JOINT OF LEFT HAND: ICD-10-CM

## 2020-12-09 PROCEDURE — 73140 X-RAY EXAM OF FINGER(S): CPT | Performed by: NURSE PRACTITIONER

## 2020-12-09 PROCEDURE — 3079F DIAST BP 80-89 MM HG: CPT | Performed by: NURSE PRACTITIONER

## 2020-12-09 PROCEDURE — 99213 OFFICE O/P EST LOW 20 MIN: CPT | Performed by: NURSE PRACTITIONER

## 2020-12-09 PROCEDURE — 3074F SYST BP LT 130 MM HG: CPT | Performed by: NURSE PRACTITIONER

## 2020-12-09 PROCEDURE — 3008F BODY MASS INDEX DOCD: CPT | Performed by: NURSE PRACTITIONER

## 2020-12-09 RX ORDER — NAPROXEN 500 MG/1
500 TABLET ORAL 2 TIMES DAILY WITH MEALS
Qty: 28 TABLET | Refills: 0 | Status: SHIPPED | OUTPATIENT
Start: 2020-12-09 | End: 2021-11-04

## 2020-12-09 NOTE — PROGRESS NOTES
HPI:    Patient ID: Aguilar Garcia is a 48year old male. Patient presents with:  Finger Pain: Left index finger is swollen and painful x 2wks      Left pointer finger swelling and mild pain x 2 weeks.  No limited range of motion or trauma to si Spouse name: Not on file      Number of children: Not on file      Years of education: Not on file      Highest education level: Not on file    Tobacco Use      Smoking status: Never Smoker      Smokeless tobacco: Never Used    Substance and Sexual Activ MCHC 32.7 09/10/2020    RDW 13.3 09/10/2020    .0 09/10/2020     Lab Results   Component Value Date    CHOLEST 198 09/10/2020    TRIG 158 (H) 09/10/2020    HDL 42 09/10/2020     (H) 09/10/2020    VLDL 32 (H) 09/10/2020    TCHDLRATIO 4.65 0

## 2020-12-24 ENCOUNTER — PATIENT MESSAGE (OUTPATIENT)
Dept: INTERNAL MEDICINE CLINIC | Facility: CLINIC | Age: 55
End: 2020-12-24

## 2020-12-24 DIAGNOSIS — M25.442 SWELLING OF FINGER JOINT OF LEFT HAND: Primary | ICD-10-CM

## 2020-12-24 DIAGNOSIS — M79.645 PAIN OF FINGER OF LEFT HAND: ICD-10-CM

## 2020-12-28 NOTE — TELEPHONE ENCOUNTER
From: Anca Ludwig  To: IRVIN Stevens  Sent: 12/24/2020 9:06 AM CST  Subject: Visit Follow-up Question    Yue Situ,    I'm sending you this message because the swelling and pain in my left pointer finger continues.  I've finished the 10 da

## 2020-12-28 NOTE — TELEPHONE ENCOUNTER
Tama Kocher, MD    [facility]    [address] [facility] [address]    [phone]    Contact information provided regarding Dr. Diogenes Glasgow.

## 2021-01-04 ENCOUNTER — TELEPHONE (OUTPATIENT)
Dept: INTERNAL MEDICINE CLINIC | Facility: CLINIC | Age: 56
End: 2021-01-04

## 2021-01-04 NOTE — TELEPHONE ENCOUNTER
Mila Zarate [facility] Clinical Staff             Good Morning,     Denial was received for MRI FINGER(S), LEFT 2ND (CPT=73221)     Reason for Denial : The   reason this request cannot be approved is because: 1.  We did not receive records showing that you

## 2021-01-15 ENCOUNTER — MED REC SCAN ONLY (OUTPATIENT)
Dept: INTERNAL MEDICINE CLINIC | Facility: CLINIC | Age: 56
End: 2021-01-15

## 2021-11-04 ENCOUNTER — OFFICE VISIT (OUTPATIENT)
Dept: INTERNAL MEDICINE CLINIC | Facility: CLINIC | Age: 56
End: 2021-11-04
Payer: COMMERCIAL

## 2021-11-04 VITALS
HEIGHT: 67.5 IN | DIASTOLIC BLOOD PRESSURE: 66 MMHG | TEMPERATURE: 99 F | WEIGHT: 198 LBS | SYSTOLIC BLOOD PRESSURE: 122 MMHG | OXYGEN SATURATION: 98 % | RESPIRATION RATE: 12 BRPM | BODY MASS INDEX: 30.71 KG/M2 | HEART RATE: 83 BPM

## 2021-11-04 DIAGNOSIS — Z00.00 LABORATORY EXAMINATION ORDERED AS PART OF A ROUTINE GENERAL MEDICAL EXAMINATION: ICD-10-CM

## 2021-11-04 DIAGNOSIS — Z13.89 SCREENING FOR GENITOURINARY CONDITION: ICD-10-CM

## 2021-11-04 DIAGNOSIS — Z00.00 ANNUAL PHYSICAL EXAM: Primary | ICD-10-CM

## 2021-11-04 DIAGNOSIS — Z13.220 LIPID SCREENING: ICD-10-CM

## 2021-11-04 DIAGNOSIS — Z13.29 THYROID DISORDER SCREEN: ICD-10-CM

## 2021-11-04 DIAGNOSIS — Z13.0 SCREENING, IRON DEFICIENCY ANEMIA: ICD-10-CM

## 2021-11-04 DIAGNOSIS — Z12.5 PROSTATE CANCER SCREENING: ICD-10-CM

## 2021-11-04 PROCEDURE — 3078F DIAST BP <80 MM HG: CPT | Performed by: INTERNAL MEDICINE

## 2021-11-04 PROCEDURE — 99396 PREV VISIT EST AGE 40-64: CPT | Performed by: INTERNAL MEDICINE

## 2021-11-04 PROCEDURE — 3008F BODY MASS INDEX DOCD: CPT | Performed by: INTERNAL MEDICINE

## 2021-11-04 PROCEDURE — 3074F SYST BP LT 130 MM HG: CPT | Performed by: INTERNAL MEDICINE

## 2021-11-04 RX ORDER — HYDROCORTISONE 25 MG/G
1 CREAM TOPICAL 2 TIMES DAILY
Qty: 30 G | Refills: 1 | Status: SHIPPED | OUTPATIENT
Start: 2021-11-04

## 2021-11-04 NOTE — PROGRESS NOTES
Subjective:   Patient ID: Shadi Paredes is a 55year old male.     HPI  Shadi Paredes is a 55year old male who presents for a complete physical exam.   HPI:   Pt complains of nothing  Normal state of health    PAST MEDICAL, SOCIAL, FAMI Results   Component Value Date    PSA 0.20 09/10/2020    PSA 0.20 09/05/2019        Current Outpatient Medications   Medication Sig Dispense Refill   • hydrocortisone (PROCTOSOL HC) 2.5 % External Cream Place 1 Application rectally 2 (two) times daily.  27 • Cancer Maternal Grandfather    • Colon Cancer Maternal Grandfather    • Colon Cancer Maternal Uncle    • Prostate Cancer Maternal Uncle    • Mental Disorder Sister       Social History:  Social History    Tobacco Use      Smoking status: Never Smoker aerobic exercise 30 minutes three times weekly. Health maintenance, will check fasting Lipids, CMP, CBC and PSA. Pt is up to date with screening colonoscopy. Pt info handouts given for: exercise, low fat diet  and agrees to the plan.   The patient is as

## 2022-11-08 ENCOUNTER — LAB ENCOUNTER (OUTPATIENT)
Dept: LAB | Age: 57
End: 2022-11-08
Attending: INTERNAL MEDICINE
Payer: COMMERCIAL

## 2022-11-08 ENCOUNTER — TELEPHONE (OUTPATIENT)
Dept: INTERNAL MEDICINE CLINIC | Facility: CLINIC | Age: 57
End: 2022-11-08

## 2022-11-08 ENCOUNTER — OFFICE VISIT (OUTPATIENT)
Dept: INTERNAL MEDICINE CLINIC | Facility: CLINIC | Age: 57
End: 2022-11-08
Payer: COMMERCIAL

## 2022-11-08 VITALS
TEMPERATURE: 98 F | SYSTOLIC BLOOD PRESSURE: 122 MMHG | OXYGEN SATURATION: 98 % | WEIGHT: 193 LBS | HEIGHT: 67.5 IN | RESPIRATION RATE: 16 BRPM | DIASTOLIC BLOOD PRESSURE: 80 MMHG | HEART RATE: 78 BPM | BODY MASS INDEX: 29.94 KG/M2

## 2022-11-08 DIAGNOSIS — Z13.0 SCREENING, IRON DEFICIENCY ANEMIA: ICD-10-CM

## 2022-11-08 DIAGNOSIS — Z13.220 LIPID SCREENING: ICD-10-CM

## 2022-11-08 DIAGNOSIS — Z12.5 PROSTATE CANCER SCREENING: ICD-10-CM

## 2022-11-08 DIAGNOSIS — B35.6 TINEA CRURIS: ICD-10-CM

## 2022-11-08 DIAGNOSIS — Z13.29 THYROID DISORDER SCREEN: ICD-10-CM

## 2022-11-08 DIAGNOSIS — Z00.00 ANNUAL PHYSICAL EXAM: Primary | ICD-10-CM

## 2022-11-08 DIAGNOSIS — Z13.89 SCREENING FOR GENITOURINARY CONDITION: ICD-10-CM

## 2022-11-08 DIAGNOSIS — Z00.00 LABORATORY EXAMINATION ORDERED AS PART OF A ROUTINE GENERAL MEDICAL EXAMINATION: ICD-10-CM

## 2022-11-08 LAB
ALBUMIN SERPL-MCNC: 4.1 G/DL (ref 3.4–5)
ALBUMIN/GLOB SERPL: 1.2 {RATIO} (ref 1–2)
ALP LIVER SERPL-CCNC: 69 U/L
ALT SERPL-CCNC: 26 U/L
ANION GAP SERPL CALC-SCNC: 5 MMOL/L (ref 0–18)
AST SERPL-CCNC: 17 U/L (ref 15–37)
BASOPHILS # BLD AUTO: 0.05 X10(3) UL (ref 0–0.2)
BASOPHILS NFR BLD AUTO: 0.7 %
BILIRUB SERPL-MCNC: 2.2 MG/DL (ref 0.1–2)
BILIRUB UR QL STRIP.AUTO: NEGATIVE
BUN BLD-MCNC: 12 MG/DL (ref 7–18)
CALCIUM BLD-MCNC: 9.3 MG/DL (ref 8.5–10.1)
CHLORIDE SERPL-SCNC: 106 MMOL/L (ref 98–112)
CHOLEST SERPL-MCNC: 180 MG/DL (ref ?–200)
CO2 SERPL-SCNC: 28 MMOL/L (ref 21–32)
COMPLEXED PSA SERPL-MCNC: 0.21 NG/ML (ref ?–4)
CREAT BLD-MCNC: 1.07 MG/DL
EOSINOPHIL # BLD AUTO: 0.16 X10(3) UL (ref 0–0.7)
EOSINOPHIL NFR BLD AUTO: 2.3 %
ERYTHROCYTE [DISTWIDTH] IN BLOOD BY AUTOMATED COUNT: 13.1 %
EST. AVERAGE GLUCOSE BLD GHB EST-MCNC: 114 MG/DL (ref 68–126)
FASTING PATIENT LIPID ANSWER: YES
FASTING STATUS PATIENT QL REPORTED: YES
GFR SERPLBLD BASED ON 1.73 SQ M-ARVRAT: 81 ML/MIN/1.73M2 (ref 60–?)
GLOBULIN PLAS-MCNC: 3.5 G/DL (ref 2.8–4.4)
GLUCOSE BLD-MCNC: 91 MG/DL (ref 70–99)
GLUCOSE UR STRIP.AUTO-MCNC: NEGATIVE MG/DL
HBA1C MFR BLD: 5.6 % (ref ?–5.7)
HCT VFR BLD AUTO: 47.6 %
HDLC SERPL-MCNC: 38 MG/DL (ref 40–59)
HGB BLD-MCNC: 16 G/DL
HYALINE CASTS #/AREA URNS AUTO: PRESENT /LPF
IMM GRANULOCYTES # BLD AUTO: 0.01 X10(3) UL (ref 0–1)
IMM GRANULOCYTES NFR BLD: 0.1 %
LDLC SERPL CALC-MCNC: 120 MG/DL (ref ?–100)
LEUKOCYTE ESTERASE UR QL STRIP.AUTO: NEGATIVE
LYMPHOCYTES # BLD AUTO: 1.9 X10(3) UL (ref 1–4)
LYMPHOCYTES NFR BLD AUTO: 27.1 %
MCH RBC QN AUTO: 30.1 PG (ref 26–34)
MCHC RBC AUTO-ENTMCNC: 33.6 G/DL (ref 31–37)
MCV RBC AUTO: 89.6 FL
MONOCYTES # BLD AUTO: 0.35 X10(3) UL (ref 0.1–1)
MONOCYTES NFR BLD AUTO: 5 %
NEUTROPHILS # BLD AUTO: 4.53 X10 (3) UL (ref 1.5–7.7)
NEUTROPHILS # BLD AUTO: 4.53 X10(3) UL (ref 1.5–7.7)
NEUTROPHILS NFR BLD AUTO: 64.8 %
NITRITE UR QL STRIP.AUTO: NEGATIVE
NONHDLC SERPL-MCNC: 142 MG/DL (ref ?–130)
OSMOLALITY SERPL CALC.SUM OF ELEC: 287 MOSM/KG (ref 275–295)
PH UR STRIP.AUTO: 5 [PH] (ref 5–8)
PLATELET # BLD AUTO: 328 10(3)UL (ref 150–450)
POTASSIUM SERPL-SCNC: 4.1 MMOL/L (ref 3.5–5.1)
PROT SERPL-MCNC: 7.6 G/DL (ref 6.4–8.2)
PROT UR STRIP.AUTO-MCNC: 30 MG/DL
RBC # BLD AUTO: 5.31 X10(6)UL
RBC UR QL AUTO: NEGATIVE
SODIUM SERPL-SCNC: 139 MMOL/L (ref 136–145)
SP GR UR STRIP.AUTO: 1.03 (ref 1–1.03)
TRIGL SERPL-MCNC: 123 MG/DL (ref 30–149)
TSI SER-ACNC: 1.32 MIU/ML (ref 0.36–3.74)
UROBILINOGEN UR STRIP.AUTO-MCNC: <2 MG/DL
VLDLC SERPL CALC-MCNC: 22 MG/DL (ref 0–30)
WBC # BLD AUTO: 7 X10(3) UL (ref 4–11)

## 2022-11-08 PROCEDURE — 99396 PREV VISIT EST AGE 40-64: CPT | Performed by: INTERNAL MEDICINE

## 2022-11-08 PROCEDURE — 81001 URINALYSIS AUTO W/SCOPE: CPT

## 2022-11-08 PROCEDURE — 36415 COLL VENOUS BLD VENIPUNCTURE: CPT

## 2022-11-08 PROCEDURE — 83036 HEMOGLOBIN GLYCOSYLATED A1C: CPT

## 2022-11-08 PROCEDURE — 80053 COMPREHEN METABOLIC PANEL: CPT

## 2022-11-08 PROCEDURE — 3074F SYST BP LT 130 MM HG: CPT | Performed by: INTERNAL MEDICINE

## 2022-11-08 PROCEDURE — 80061 LIPID PANEL: CPT

## 2022-11-08 PROCEDURE — 3008F BODY MASS INDEX DOCD: CPT | Performed by: INTERNAL MEDICINE

## 2022-11-08 PROCEDURE — 3079F DIAST BP 80-89 MM HG: CPT | Performed by: INTERNAL MEDICINE

## 2022-11-08 PROCEDURE — 85025 COMPLETE CBC W/AUTO DIFF WBC: CPT

## 2022-11-08 PROCEDURE — 84443 ASSAY THYROID STIM HORMONE: CPT

## 2022-11-08 RX ORDER — CLOTRIMAZOLE AND BETAMETHASONE DIPROPIONATE 10; .64 MG/G; MG/G
45 CREAM TOPICAL 2 TIMES DAILY PRN
Qty: 60 G | Refills: 2 | Status: SHIPPED | OUTPATIENT
Start: 2022-11-08

## 2022-11-08 RX ORDER — CLOTRIMAZOLE AND BETAMETHASONE DIPROPIONATE 10; .64 MG/G; MG/G
45 CREAM TOPICAL 2 TIMES DAILY PRN
Qty: 60 G | Refills: 2 | Status: SHIPPED | OUTPATIENT
Start: 2022-11-08 | End: 2022-11-08

## 2023-05-18 RX ORDER — HYDROCORTISONE 25 MG/G
1 CREAM TOPICAL 2 TIMES DAILY
Qty: 30 G | Refills: 1 | Status: SHIPPED | OUTPATIENT
Start: 2023-05-18

## 2023-05-18 NOTE — TELEPHONE ENCOUNTER
Last time medication was refilled 11/04/2021  Quantity and # of refills 30 g w/ 1  Last OV 11/08/2022  Next OV 11/09/2023    Sent to Dr. Jono Bobo for approval.

## 2023-06-30 PROBLEM — K62.89 RECTAL PAIN: Status: ACTIVE | Noted: 2023-06-30

## 2023-06-30 PROBLEM — Z80.0 FAMILY HISTORY OF COLON CANCER: Status: ACTIVE | Noted: 2023-06-30

## 2023-06-30 PROBLEM — Z86.010 HISTORY OF ADENOMATOUS POLYP OF COLON: Status: ACTIVE | Noted: 2023-06-30

## 2023-06-30 PROBLEM — R19.4 CHANGE IN BOWEL HABITS: Status: ACTIVE | Noted: 2023-06-30

## 2023-06-30 PROBLEM — Z86.0101 HISTORY OF ADENOMATOUS POLYP OF COLON: Status: ACTIVE | Noted: 2023-06-30

## 2023-06-30 PROBLEM — K62.5 RECTUM BLEEDING: Status: ACTIVE | Noted: 2023-06-30

## 2023-06-30 PROCEDURE — 88305 TISSUE EXAM BY PATHOLOGIST: CPT | Performed by: INTERNAL MEDICINE

## 2023-09-08 ENCOUNTER — OFFICE VISIT (OUTPATIENT)
Dept: INTERNAL MEDICINE CLINIC | Facility: CLINIC | Age: 58
End: 2023-09-08
Payer: COMMERCIAL

## 2023-09-08 VITALS
HEART RATE: 94 BPM | WEIGHT: 196 LBS | RESPIRATION RATE: 16 BRPM | DIASTOLIC BLOOD PRESSURE: 82 MMHG | TEMPERATURE: 98 F | HEIGHT: 68 IN | SYSTOLIC BLOOD PRESSURE: 128 MMHG | OXYGEN SATURATION: 95 % | BODY MASS INDEX: 29.7 KG/M2

## 2023-09-08 DIAGNOSIS — J98.01 BRONCHOSPASM: Primary | ICD-10-CM

## 2023-09-08 PROBLEM — K62.89 RECTAL PAIN: Status: RESOLVED | Noted: 2023-06-30 | Resolved: 2023-09-08

## 2023-09-08 PROBLEM — K62.5 RECTUM BLEEDING: Status: RESOLVED | Noted: 2023-06-30 | Resolved: 2023-09-08

## 2023-09-08 PROBLEM — R19.4 CHANGE IN BOWEL HABITS: Status: RESOLVED | Noted: 2023-06-30 | Resolved: 2023-09-08

## 2023-09-08 PROCEDURE — 3008F BODY MASS INDEX DOCD: CPT | Performed by: INTERNAL MEDICINE

## 2023-09-08 PROCEDURE — 3074F SYST BP LT 130 MM HG: CPT | Performed by: INTERNAL MEDICINE

## 2023-09-08 PROCEDURE — 99214 OFFICE O/P EST MOD 30 MIN: CPT | Performed by: INTERNAL MEDICINE

## 2023-09-08 PROCEDURE — 3079F DIAST BP 80-89 MM HG: CPT | Performed by: INTERNAL MEDICINE

## 2023-09-08 RX ORDER — PREDNISONE 20 MG/1
TABLET ORAL
Qty: 14 TABLET | Refills: 0 | Status: SHIPPED | OUTPATIENT
Start: 2023-09-08

## 2023-09-08 NOTE — PROGRESS NOTES
Subjective:   Patient ID: Ryan Lopez is a 63year old male. Cough  This is a new problem. The current episode started more than 1 month ago. The problem has been rapidly worsening. The problem occurs every few minutes. Associated symptoms include chest pain (lower sternum) and nasal congestion. Pertinent negatives include no ear congestion, ear pain, fever, heartburn, hemoptysis, postnasal drip, rhinorrhea, shortness of breath, sweats, weight loss or wheezing. The symptoms are aggravated by lying down and cold air. Risk factors for lung disease include travel. He has tried body position changes, steroid inhaler and rest (given amox in agnes) for the symptoms. The treatment provided mild relief. History/Other:   Review of Systems   Constitutional:  Negative for fever and weight loss. HENT:  Negative for ear pain, postnasal drip and rhinorrhea. Respiratory:  Positive for cough. Negative for hemoptysis, shortness of breath and wheezing. Cardiovascular:  Positive for chest pain (lower sternum). Gastrointestinal:  Negative for heartburn. All other systems reviewed and are negative. Current Outpatient Medications   Medication Sig Dispense Refill    predniSONE 20 MG Oral Tab Take 2 tablets by mouth daily x 7 days 14 tablet 0    hydrocortisone (PROCTOSOL HC) 2.5 % External Cream Place 1 Application. rectally 2 (two) times daily. 30 g 1    clotrimazole-betamethasone 1-0.05 % External Cream Apply 45 g topically 2 (two) times daily as needed. 60 g 2    escitalopram 10 MG Oral Tab Take 1 tablet (10 mg total) by mouth daily. simvastatin 20 MG Oral Tab Take 1 tablet (20 mg total) by mouth nightly. omeprazole (PRILOSEC) 20 MG Oral Capsule Delayed Release Take 1 capsule (20 mg total) by mouth every morning before breakfast. As needed      LORazepam (ATIVAN) 2 MG Oral Tab Take 1 tablet by mouth 3 (three) times daily as needed for Anxiety.  90 tablet 1     Allergies:No Known Allergies    Objective:   Physical Exam  Vitals reviewed. Constitutional:       Appearance: Normal appearance. Cardiovascular:      Rate and Rhythm: Normal rate and regular rhythm. Pulses: Normal pulses. Heart sounds: Normal heart sounds. Pulmonary:      Effort: Pulmonary effort is normal.      Breath sounds: Normal breath sounds. Neurological:      Mental Status: He is alert. Assessment & Plan:   Bronchospasm  (primary encounter diagnosis)  - cont fluticasone bid (pt received this in [facility])  - finish prednisone as ordered  No orders of the defined types were placed in this encounter.       Meds This Visit:  Requested Prescriptions     Signed Prescriptions Disp Refills    predniSONE 20 MG Oral Tab 14 tablet 0     Sig: Take 2 tablets by mouth daily x 7 days       Imaging & Referrals:  None

## 2023-11-09 ENCOUNTER — LAB ENCOUNTER (OUTPATIENT)
Dept: LAB | Age: 58
End: 2023-11-09
Attending: INTERNAL MEDICINE
Payer: COMMERCIAL

## 2023-11-09 ENCOUNTER — OFFICE VISIT (OUTPATIENT)
Dept: INTERNAL MEDICINE CLINIC | Facility: CLINIC | Age: 58
End: 2023-11-09
Payer: COMMERCIAL

## 2023-11-09 VITALS
HEART RATE: 80 BPM | SYSTOLIC BLOOD PRESSURE: 124 MMHG | WEIGHT: 194 LBS | TEMPERATURE: 98 F | OXYGEN SATURATION: 98 % | RESPIRATION RATE: 16 BRPM | HEIGHT: 68 IN | DIASTOLIC BLOOD PRESSURE: 70 MMHG | BODY MASS INDEX: 29.4 KG/M2

## 2023-11-09 DIAGNOSIS — Z00.00 ANNUAL PHYSICAL EXAM: Primary | ICD-10-CM

## 2023-11-09 DIAGNOSIS — Z12.5 PROSTATE CANCER SCREENING: ICD-10-CM

## 2023-11-09 DIAGNOSIS — Z23 NEED FOR TDAP VACCINATION: ICD-10-CM

## 2023-11-09 DIAGNOSIS — Z91.89 CARDIOVASCULAR RISK FACTOR: ICD-10-CM

## 2023-11-09 DIAGNOSIS — Z00.00 ROUTINE GENERAL MEDICAL EXAMINATION AT A HEALTH CARE FACILITY: ICD-10-CM

## 2023-11-09 LAB
ALBUMIN SERPL-MCNC: 4 G/DL (ref 3.4–5)
ALBUMIN/GLOB SERPL: 1.1 {RATIO} (ref 1–2)
ALP LIVER SERPL-CCNC: 61 U/L
ALT SERPL-CCNC: 20 U/L
ANION GAP SERPL CALC-SCNC: 5 MMOL/L (ref 0–18)
AST SERPL-CCNC: 19 U/L (ref 15–37)
BASOPHILS # BLD AUTO: 0.05 X10(3) UL (ref 0–0.2)
BASOPHILS NFR BLD AUTO: 0.9 %
BILIRUB SERPL-MCNC: 1.9 MG/DL (ref 0.1–2)
BILIRUB UR QL STRIP.AUTO: NEGATIVE
BUN BLD-MCNC: 12 MG/DL (ref 9–23)
CALCIUM BLD-MCNC: 9 MG/DL (ref 8.5–10.1)
CHLORIDE SERPL-SCNC: 106 MMOL/L (ref 98–112)
CHOLEST SERPL-MCNC: 196 MG/DL (ref ?–200)
CLARITY UR REFRACT.AUTO: CLEAR
CO2 SERPL-SCNC: 28 MMOL/L (ref 21–32)
COLOR UR AUTO: YELLOW
COMPLEXED PSA SERPL-MCNC: 0.56 NG/ML (ref ?–4)
CREAT BLD-MCNC: 1.25 MG/DL
EGFRCR SERPLBLD CKD-EPI 2021: 67 ML/MIN/1.73M2 (ref 60–?)
EOSINOPHIL # BLD AUTO: 0.17 X10(3) UL (ref 0–0.7)
EOSINOPHIL NFR BLD AUTO: 2.9 %
ERYTHROCYTE [DISTWIDTH] IN BLOOD BY AUTOMATED COUNT: 13 %
FASTING PATIENT LIPID ANSWER: YES
FASTING STATUS PATIENT QL REPORTED: YES
GLOBULIN PLAS-MCNC: 3.8 G/DL (ref 2.8–4.4)
GLUCOSE BLD-MCNC: 84 MG/DL (ref 70–99)
GLUCOSE UR STRIP.AUTO-MCNC: NORMAL MG/DL
HCT VFR BLD AUTO: 49.2 %
HDLC SERPL-MCNC: 39 MG/DL (ref 40–59)
HGB BLD-MCNC: 16.4 G/DL
IMM GRANULOCYTES # BLD AUTO: 0.01 X10(3) UL (ref 0–1)
IMM GRANULOCYTES NFR BLD: 0.2 %
KETONES UR STRIP.AUTO-MCNC: NEGATIVE MG/DL
LDLC SERPL CALC-MCNC: 125 MG/DL (ref ?–100)
LEUKOCYTE ESTERASE UR QL STRIP.AUTO: NEGATIVE
LYMPHOCYTES # BLD AUTO: 1.86 X10(3) UL (ref 1–4)
LYMPHOCYTES NFR BLD AUTO: 32.2 %
MCH RBC QN AUTO: 28.8 PG (ref 26–34)
MCHC RBC AUTO-ENTMCNC: 33.3 G/DL (ref 31–37)
MCV RBC AUTO: 86.3 FL
MONOCYTES # BLD AUTO: 0.3 X10(3) UL (ref 0.1–1)
MONOCYTES NFR BLD AUTO: 5.2 %
NEUTROPHILS # BLD AUTO: 3.38 X10 (3) UL (ref 1.5–7.7)
NEUTROPHILS # BLD AUTO: 3.38 X10(3) UL (ref 1.5–7.7)
NEUTROPHILS NFR BLD AUTO: 58.6 %
NITRITE UR QL STRIP.AUTO: NEGATIVE
NONHDLC SERPL-MCNC: 157 MG/DL (ref ?–130)
OSMOLALITY SERPL CALC.SUM OF ELEC: 287 MOSM/KG (ref 275–295)
PH UR STRIP.AUTO: 5.5 [PH] (ref 5–8)
PLATELET # BLD AUTO: 346 10(3)UL (ref 150–450)
POTASSIUM SERPL-SCNC: 3.7 MMOL/L (ref 3.5–5.1)
PROT SERPL-MCNC: 7.8 G/DL (ref 6.4–8.2)
RBC # BLD AUTO: 5.7 X10(6)UL
SODIUM SERPL-SCNC: 139 MMOL/L (ref 136–145)
SP GR UR STRIP.AUTO: 1.02 (ref 1–1.03)
TRIGL SERPL-MCNC: 182 MG/DL (ref 30–149)
TSI SER-ACNC: 1.39 MIU/ML (ref 0.36–3.74)
UROBILINOGEN UR STRIP.AUTO-MCNC: NORMAL MG/DL
VLDLC SERPL CALC-MCNC: 33 MG/DL (ref 0–30)
WBC # BLD AUTO: 5.8 X10(3) UL (ref 4–11)

## 2023-11-09 PROCEDURE — 85025 COMPLETE CBC W/AUTO DIFF WBC: CPT

## 2023-11-09 PROCEDURE — 36415 COLL VENOUS BLD VENIPUNCTURE: CPT

## 2023-11-09 PROCEDURE — 81001 URINALYSIS AUTO W/SCOPE: CPT

## 2023-11-09 PROCEDURE — 3074F SYST BP LT 130 MM HG: CPT | Performed by: INTERNAL MEDICINE

## 2023-11-09 PROCEDURE — 99396 PREV VISIT EST AGE 40-64: CPT | Performed by: INTERNAL MEDICINE

## 2023-11-09 PROCEDURE — 3008F BODY MASS INDEX DOCD: CPT | Performed by: INTERNAL MEDICINE

## 2023-11-09 PROCEDURE — 80061 LIPID PANEL: CPT

## 2023-11-09 PROCEDURE — 80053 COMPREHEN METABOLIC PANEL: CPT

## 2023-11-09 PROCEDURE — 84443 ASSAY THYROID STIM HORMONE: CPT

## 2023-11-09 PROCEDURE — 3078F DIAST BP <80 MM HG: CPT | Performed by: INTERNAL MEDICINE

## 2023-11-19 ENCOUNTER — HOSPITAL ENCOUNTER (OUTPATIENT)
Dept: CT IMAGING | Age: 58
Discharge: HOME | End: 2023-11-19
Attending: INTERNAL MEDICINE

## 2023-11-19 DIAGNOSIS — Z91.89 CARDIOVASCULAR RISK FACTOR: ICD-10-CM

## 2024-02-14 RX ORDER — HYDROCORTISONE 25 MG/G
1 CREAM TOPICAL 2 TIMES DAILY
Qty: 30 G | Refills: 0 | Status: SHIPPED | OUTPATIENT
Start: 2024-02-14

## 2024-02-14 NOTE — TELEPHONE ENCOUNTER
Last time medication was refilled 05/18/2023  Quantity and # of refills 30g w/ 1  Last OV 11/09/2023  Next OV   Future Appointments   Date Time Provider Department Center   11/12/2024  9:00 AM Wes Shelton MD [facility] [address]       Passed protocol, Rx sent.

## 2024-07-25 ENCOUNTER — OFFICE VISIT (OUTPATIENT)
Dept: INTERNAL MEDICINE CLINIC | Facility: CLINIC | Age: 59
End: 2024-07-25
Payer: COMMERCIAL

## 2024-07-25 ENCOUNTER — LAB ENCOUNTER (OUTPATIENT)
Dept: LAB | Age: 59
End: 2024-07-25
Attending: NURSE PRACTITIONER
Payer: COMMERCIAL

## 2024-07-25 VITALS
HEIGHT: 68 IN | RESPIRATION RATE: 18 BRPM | OXYGEN SATURATION: 98 % | SYSTOLIC BLOOD PRESSURE: 122 MMHG | WEIGHT: 185 LBS | TEMPERATURE: 97 F | BODY MASS INDEX: 28.04 KG/M2 | DIASTOLIC BLOOD PRESSURE: 82 MMHG | HEART RATE: 87 BPM

## 2024-07-25 DIAGNOSIS — M25.562 ACUTE PAIN OF LEFT KNEE: ICD-10-CM

## 2024-07-25 DIAGNOSIS — E78.2 MIXED HYPERLIPIDEMIA: ICD-10-CM

## 2024-07-25 DIAGNOSIS — M25.562 ACUTE PAIN OF LEFT KNEE: Primary | ICD-10-CM

## 2024-07-25 DIAGNOSIS — K64.0 GRADE I HEMORRHOIDS: ICD-10-CM

## 2024-07-25 LAB — URATE SERPL-MCNC: 6.2 MG/DL

## 2024-07-25 PROCEDURE — 84550 ASSAY OF BLOOD/URIC ACID: CPT

## 2024-07-25 PROCEDURE — 36415 COLL VENOUS BLD VENIPUNCTURE: CPT

## 2024-07-25 RX ORDER — SIMVASTATIN 20 MG
20 TABLET ORAL NIGHTLY
Qty: 90 TABLET | Refills: 3 | Status: SHIPPED | OUTPATIENT
Start: 2024-07-25

## 2024-07-25 RX ORDER — NAPROXEN 500 MG/1
500 TABLET ORAL 2 TIMES DAILY WITH MEALS
Qty: 28 TABLET | Refills: 0 | Status: SHIPPED | OUTPATIENT
Start: 2024-07-25

## 2024-07-25 RX ORDER — HYDROCORTISONE 25 MG/G
1 CREAM TOPICAL 2 TIMES DAILY
Qty: 30 G | Refills: 0 | Status: SHIPPED | OUTPATIENT
Start: 2024-07-25

## 2024-07-25 NOTE — PROGRESS NOTES
HPI:    Patient ID: Terence Reid is a 59 year old male.    Chief Complaint   Patient presents with    Knee Pain     Left knee pain, patient states knee being swollen x2 weeks    Ringing In Ear       Recently in [place] and experienced acute left knee pain. He was seen in ER and knee was drained. He was seen by ortho here, MRI with arthritis. He did have steroid injection on Tuesday and pain has improved. He did take medrol dose pack and after completion did use naproxen as well. Ortho told him to get uric acid level checked        Review of Systems   Constitutional: Negative.    Respiratory: Negative.     Cardiovascular: Negative.    Musculoskeletal:  Positive for arthralgias and joint swelling.         Past Medical History:    Abdominal hernia    Abdominal pain    Anxiety    Arthritis    Asthma (HCC)    Back pain    Bad breath    Belching    Blurred vision    Constipation    Depression    Diarrhea, unspecified    Esophageal reflux    Fatigue    Feeling lonely    Flatulence/gas pain/belching    Food intolerance    Milk    Frequent use of laxatives    Heart palpitations    Heartburn    Hemorrhoids    High cholesterol    History of depression    Hyperlipidemia    Night sweats    Pain in joints    Rash    Stress    Wears glasses    Weight gain     Past Surgical History:   Procedure Laterality Date    Back surgery  2017    Colonoscopy  7/25/14    Dr. Díaz    Colonoscopy      Hernia surgery  2017    Tonsillectomy  1972     Family History   Problem Relation Age of Onset    Ulcerative Colitis Father     Bleeding Disorders Father     Asthma Mother     Colon Polyps Mother     Other (Other) Daughter         add    Asthma Son     Other (Other) Son         add    Cancer Maternal Grandfather     Colon Cancer Maternal Grandfather     Asthma Paternal Grandfather     Bipolar Disorder Sister     Psychiatric Sister     Mental Disorder Sister     Colon Cancer Maternal Uncle     Prostate Cancer Maternal Uncle      Prostate Cancer Cousin      Social History     Socioeconomic History    Marital status:    Tobacco Use    Smoking status: Never    Smokeless tobacco: Never   Vaping Use    Vaping status: Never Used   Substance and Sexual Activity    Alcohol use: No    Drug use: No   Other Topics Concern    Caffeine Concern No    Exercise No    Seat Belt No    Special Diet No    Stress Concern Yes    Weight Concern No          Current Outpatient Medications   Medication Sig Dispense Refill    hydrocortisone (PROCTOSOL HC) 2.5 % External Cream Place 1 Application  rectally 2 (two) times daily. 30 g 0    naproxen 500 MG Oral Tab Take 1 tablet (500 mg total) by mouth 2 (two) times daily with meals. 28 tablet 0    simvastatin 20 MG Oral Tab Take 1 tablet (20 mg total) by mouth nightly. 90 tablet 3    escitalopram 10 MG Oral Tab Take 1 tablet (10 mg total) by mouth daily.      omeprazole (PRILOSEC) 20 MG Oral Capsule Delayed Release Take 1 capsule (20 mg total) by mouth every morning before breakfast. As needed      LORazepam (ATIVAN) 2 MG Oral Tab Take 1 tablet by mouth 3 (three) times daily as needed for Anxiety. 90 tablet 1    clotrimazole-betamethasone 1-0.05 % External Cream Apply 45 g topically 2 (two) times daily as needed. (Patient not taking: Reported on 7/25/2024) 60 g 2     Allergies:No Known Allergies    Lab Results   Component Value Date    GLU 84 11/09/2023    BUN 12 11/09/2023    BUNCREA NOT APPLICABLE 11/04/2021    CREATSERUM 1.25 11/09/2023    ANIONGAP 5 11/09/2023    GFR 91 08/01/2016    GFRNAA 82 11/04/2021    GFRAA 95 11/04/2021    CA 9.0 11/09/2023    OSMOCALC 287 11/09/2023    ALKPHO 61 11/09/2023    AST 19 11/09/2023    ALT 20 11/09/2023    BILT 1.9 11/09/2023    TP 7.8 11/09/2023    ALB 4.0 11/09/2023    GLOBULIN 3.8 11/09/2023    AGRATIO 1.7 11/04/2021     11/09/2023    K 3.7 11/09/2023     11/09/2023    CO2 28.0 11/09/2023     Lab Results   Component Value Date    WBC 5.8 11/09/2023    RBC 5.70  11/09/2023    HGB 16.4 11/09/2023    HCT 49.2 11/09/2023    MCV 86.3 11/09/2023    MCH 28.8 11/09/2023    MCHC 33.3 11/09/2023    RDW 13.0 11/09/2023    .0 11/09/2023     Lab Results   Component Value Date    CHOLEST 196 11/09/2023    TRIG 182 (H) 11/09/2023    HDL 39 (L) 11/09/2023     (H) 11/09/2023    VLDL 33 (H) 11/09/2023    TCHDLRATIO 5.5 (H) 11/04/2021    NONHDLC 157 (H) 11/09/2023     Lab Results   Component Value Date     11/08/2022    A1C 5.6 11/08/2022     Lab Results   Component Value Date    TSH 1.390 11/09/2023    TSHT4 1.23 11/04/2021     Lab Results   Component Value Date    VITD 22.0 (L) 06/29/2015     No results found for: \"MALU\"  No results found for: \"FOLIC\", \"FOLATESER\", \"FOLATE\"  No results found for: \"IRON\", \"IRONTOT\"  No results found for: \"B12\", \"VITB12\"  No results found for: \"PHOS\", \"PHOSPHORUS\"  No results found for: \"MG\"     PHYSICAL EXAM:   /82   Pulse 87   Temp 97.2 °F (36.2 °C) (Temporal)   Resp 18   Ht 5' 8\" (1.727 m)   Wt 185 lb (83.9 kg)   SpO2 98%   BMI 28.13 kg/m²   Physical Exam  Vitals and nursing note reviewed.   Constitutional:       Appearance: Normal appearance.   Cardiovascular:      Rate and Rhythm: Normal rate.   Pulmonary:      Effort: Pulmonary effort is normal.   Musculoskeletal:      Left knee: Normal.      Right lower leg: No edema.      Left lower leg: No edema.   Neurological:      Mental Status: He is alert.              ASSESSMENT/PLAN:   Diagnoses and all orders for this visit:    Acute pain of left knee  -     Uric Acid; Future  -     naproxen 500 MG Oral Tab; Take 1 tablet (500 mg total) by mouth 2 (two) times daily with meals.    Grade I hemorrhoids  -     hydrocortisone (PROCTOSOL HC) 2.5 % External Cream; Place 1 Application  rectally 2 (two) times daily.    Mixed hyperlipidemia  -     simvastatin 20 MG Oral Tab; Take 1 tablet (20 mg total) by mouth nightly.            Renae Rushing, APRN

## 2024-11-12 ENCOUNTER — LAB ENCOUNTER (OUTPATIENT)
Dept: LAB | Age: 59
End: 2024-11-12
Attending: INTERNAL MEDICINE
Payer: COMMERCIAL

## 2024-11-12 ENCOUNTER — OFFICE VISIT (OUTPATIENT)
Dept: INTERNAL MEDICINE CLINIC | Facility: CLINIC | Age: 59
End: 2024-11-12
Payer: COMMERCIAL

## 2024-11-12 VITALS
OXYGEN SATURATION: 99 % | TEMPERATURE: 98 F | RESPIRATION RATE: 16 BRPM | DIASTOLIC BLOOD PRESSURE: 78 MMHG | WEIGHT: 188 LBS | HEART RATE: 73 BPM | BODY MASS INDEX: 28.49 KG/M2 | SYSTOLIC BLOOD PRESSURE: 122 MMHG | HEIGHT: 68 IN

## 2024-11-12 DIAGNOSIS — H61.21 IMPACTED CERUMEN OF RIGHT EAR: ICD-10-CM

## 2024-11-12 DIAGNOSIS — Z00.00 ANNUAL PHYSICAL EXAM: Primary | ICD-10-CM

## 2024-11-12 DIAGNOSIS — H81.11 BENIGN PAROXYSMAL POSITIONAL VERTIGO OF RIGHT EAR: ICD-10-CM

## 2024-11-12 DIAGNOSIS — Z00.00 ROUTINE GENERAL MEDICAL EXAMINATION AT A HEALTH CARE FACILITY: ICD-10-CM

## 2024-11-12 DIAGNOSIS — Z12.5 PROSTATE CANCER SCREENING: ICD-10-CM

## 2024-11-12 PROBLEM — Z83.719 FAMILY HISTORY OF COLONIC POLYPS: Status: RESOLVED | Noted: 2018-11-02 | Resolved: 2024-11-12

## 2024-11-12 LAB
ALBUMIN SERPL-MCNC: 4.3 G/DL (ref 3.2–4.8)
ALBUMIN/GLOB SERPL: 1.5 {RATIO} (ref 1–2)
ALP LIVER SERPL-CCNC: 61 U/L
ALT SERPL-CCNC: 10 U/L
ANION GAP SERPL CALC-SCNC: 4 MMOL/L (ref 0–18)
AST SERPL-CCNC: 18 U/L (ref ?–34)
BASOPHILS # BLD AUTO: 0.04 X10(3) UL (ref 0–0.2)
BASOPHILS NFR BLD AUTO: 0.7 %
BILIRUB SERPL-MCNC: 1.8 MG/DL (ref 0.3–1.2)
BILIRUB UR QL STRIP.AUTO: NEGATIVE
BUN BLD-MCNC: 11 MG/DL (ref 9–23)
CALCIUM BLD-MCNC: 8.7 MG/DL (ref 8.7–10.4)
CHLORIDE SERPL-SCNC: 107 MMOL/L (ref 98–112)
CHOLEST SERPL-MCNC: 173 MG/DL (ref ?–200)
CLARITY UR REFRACT.AUTO: CLEAR
CO2 SERPL-SCNC: 29 MMOL/L (ref 21–32)
COLOR UR AUTO: YELLOW
COMPLEXED PSA SERPL-MCNC: 0.26 NG/ML (ref ?–4)
CREAT BLD-MCNC: 0.92 MG/DL
EGFRCR SERPLBLD CKD-EPI 2021: 96 ML/MIN/1.73M2 (ref 60–?)
EOSINOPHIL # BLD AUTO: 0.18 X10(3) UL (ref 0–0.7)
EOSINOPHIL NFR BLD AUTO: 3.2 %
ERYTHROCYTE [DISTWIDTH] IN BLOOD BY AUTOMATED COUNT: 13 %
FASTING PATIENT LIPID ANSWER: YES
FASTING STATUS PATIENT QL REPORTED: YES
GLOBULIN PLAS-MCNC: 2.8 G/DL (ref 2–3.5)
GLUCOSE BLD-MCNC: 94 MG/DL (ref 70–99)
GLUCOSE UR STRIP.AUTO-MCNC: NORMAL MG/DL
HCT VFR BLD AUTO: 47.1 %
HDLC SERPL-MCNC: 36 MG/DL (ref 40–59)
HGB BLD-MCNC: 15.7 G/DL
IMM GRANULOCYTES # BLD AUTO: 0.01 X10(3) UL (ref 0–1)
IMM GRANULOCYTES NFR BLD: 0.2 %
KETONES UR STRIP.AUTO-MCNC: NEGATIVE MG/DL
LDLC SERPL CALC-MCNC: 116 MG/DL (ref ?–100)
LEUKOCYTE ESTERASE UR QL STRIP.AUTO: NEGATIVE
LYMPHOCYTES # BLD AUTO: 1.8 X10(3) UL (ref 1–4)
LYMPHOCYTES NFR BLD AUTO: 32.2 %
MCH RBC QN AUTO: 29.5 PG (ref 26–34)
MCHC RBC AUTO-ENTMCNC: 33.3 G/DL (ref 31–37)
MCV RBC AUTO: 88.4 FL
MONOCYTES # BLD AUTO: 0.34 X10(3) UL (ref 0.1–1)
MONOCYTES NFR BLD AUTO: 6.1 %
NEUTROPHILS # BLD AUTO: 3.22 X10 (3) UL (ref 1.5–7.7)
NEUTROPHILS # BLD AUTO: 3.22 X10(3) UL (ref 1.5–7.7)
NEUTROPHILS NFR BLD AUTO: 57.6 %
NITRITE UR QL STRIP.AUTO: NEGATIVE
NONHDLC SERPL-MCNC: 137 MG/DL (ref ?–130)
OSMOLALITY SERPL CALC.SUM OF ELEC: 289 MOSM/KG (ref 275–295)
PH UR STRIP.AUTO: 5.5 [PH] (ref 5–8)
PLATELET # BLD AUTO: 272 10(3)UL (ref 150–450)
POTASSIUM SERPL-SCNC: 3.9 MMOL/L (ref 3.5–5.1)
PROT SERPL-MCNC: 7.1 G/DL (ref 5.7–8.2)
PROT UR STRIP.AUTO-MCNC: NEGATIVE MG/DL
RBC # BLD AUTO: 5.33 X10(6)UL
RBC UR QL AUTO: NEGATIVE
SODIUM SERPL-SCNC: 140 MMOL/L (ref 136–145)
SP GR UR STRIP.AUTO: 1.02 (ref 1–1.03)
TRIGL SERPL-MCNC: 116 MG/DL (ref 30–149)
TSI SER-ACNC: 1.69 UIU/ML (ref 0.55–4.78)
UROBILINOGEN UR STRIP.AUTO-MCNC: NORMAL MG/DL
VLDLC SERPL CALC-MCNC: 20 MG/DL (ref 0–30)
WBC # BLD AUTO: 5.6 X10(3) UL (ref 4–11)

## 2024-11-12 PROCEDURE — 85025 COMPLETE CBC W/AUTO DIFF WBC: CPT

## 2024-11-12 PROCEDURE — 69210 REMOVE IMPACTED EAR WAX UNI: CPT | Performed by: INTERNAL MEDICINE

## 2024-11-12 PROCEDURE — 80061 LIPID PANEL: CPT

## 2024-11-12 PROCEDURE — 80053 COMPREHEN METABOLIC PANEL: CPT

## 2024-11-12 PROCEDURE — 36415 COLL VENOUS BLD VENIPUNCTURE: CPT

## 2024-11-12 PROCEDURE — 99396 PREV VISIT EST AGE 40-64: CPT | Performed by: INTERNAL MEDICINE

## 2024-11-12 PROCEDURE — 81003 URINALYSIS AUTO W/O SCOPE: CPT

## 2024-11-12 PROCEDURE — 84443 ASSAY THYROID STIM HORMONE: CPT

## 2024-11-12 RX ORDER — FLUTICASONE PROPIONATE 50 MCG
2 SPRAY, SUSPENSION (ML) NASAL DAILY
Qty: 3 EACH | Refills: 1 | Status: SHIPPED | OUTPATIENT
Start: 2024-11-12

## 2024-11-12 RX ORDER — PREDNISONE 20 MG/1
TABLET ORAL
Qty: 20 TABLET | Refills: 0 | Status: SHIPPED | OUTPATIENT
Start: 2024-11-12

## 2024-11-12 NOTE — PROGRESS NOTES
Subjective:   Patient ID: Terence Reid is a 59 year old male.    HPI  Terence Reid is a 59 year old male who presents for a complete physical exam.   HPI:   Pt complains of a few episode of BPPV while in [place]  Hearing loss right side  Normal state of health    PAST MEDICAL, SOCIAL, FAMILY HISTORIES REVIEWED WITH PT  .    Immunization History   Administered Date(s) Administered    Covid-19 Vaccine Pili (J&J) 0.5ml 04/05/2021, 08/20/2021    Covid-19 Vaccine Pfizer 30 mcg/0.3 ml 10/29/2021, 04/02/2022    Covid-19 Vaccine Pfizer Bivalent 30mcg/0.3mL 09/09/2022    Covid-19 Vaccine Pfizer Roni-Sucrose 30 mcg/0.3 ml 04/02/2022    FLULAVAL 6 months & older 0.5 ml Prefilled syringe (35181) 08/22/2020, 10/29/2021    FLUZONE 6 months and older PFS 0.5 ml (59607) 08/30/2018, 08/19/2019, 08/22/2020, 10/29/2021    Flulaval, 3 Years & >, IM 08/22/2020    Fluvirin, 3 Years & >, Im 08/31/2013, 08/20/2014, 08/20/2021    Influenza 10/18/2013, 08/19/2019, 08/11/2022, 08/15/2023    Influenza(Afluria)0.5ml QIV PFS 08/19/2019    Pneumovax 23 09/12/2014    TDAP 08/31/2013, 10/18/2013    Zoster Vaccine Recombinant Adjuvanted (Shingrix) 09/11/2020, 11/11/2020   Pended Date(s) Pended    TDAP 11/09/2023     Wt Readings from Last 6 Encounters:   11/12/24 188 lb (85.3 kg)   07/25/24 185 lb (83.9 kg)   11/09/23 194 lb (88 kg)   09/08/23 196 lb (88.9 kg)   06/07/23 190 lb (86.2 kg)   03/13/23 198 lb (89.8 kg)     Body mass index is 28.59 kg/m².     Lab Results   Component Value Date    GLU 84 11/09/2023    GLU 91 11/08/2022    GLU 88 11/04/2021     Lab Results   Component Value Date    CHOLEST 196 11/09/2023    CHOLEST 180 11/08/2022    CHOLEST 169 11/04/2021     Lab Results   Component Value Date    HDL 39 (L) 11/09/2023    HDL 38 (L) 11/08/2022    HDL 31 (L) 11/04/2021     Lab Results   Component Value Date     (H) 11/09/2023     (H) 11/08/2022     (H) 11/04/2021     Lab Results   Component Value  Date    AST 19 11/09/2023    AST 17 11/08/2022    AST 18 11/04/2021     Lab Results   Component Value Date    ALT 20 11/09/2023    ALT 26 11/08/2022    ALT 17 11/04/2021     Lab Results   Component Value Date    PSA 0.20 09/10/2020    PSA 0.20 09/05/2019        Current Outpatient Medications   Medication Sig Dispense Refill    hydrocortisone (PROCTOSOL HC) 2.5 % External Cream Place 1 Application  rectally 2 (two) times daily. 30 g 0    naproxen 500 MG Oral Tab Take 1 tablet (500 mg total) by mouth 2 (two) times daily with meals. 28 tablet 0    simvastatin 20 MG Oral Tab Take 1 tablet (20 mg total) by mouth nightly. 90 tablet 3    escitalopram 10 MG Oral Tab Take 1 tablet (10 mg total) by mouth daily.      omeprazole (PRILOSEC) 20 MG Oral Capsule Delayed Release Take 1 capsule (20 mg total) by mouth every morning before breakfast. As needed      LORazepam (ATIVAN) 2 MG Oral Tab Take 1 tablet by mouth 3 (three) times daily as needed for Anxiety. 90 tablet 1      Past Medical History:    Abdominal hernia    Abdominal pain    Anxiety    Arthritis    Asthma (HCC)    Back pain    Bad breath    Belching    Blurred vision    Constipation    Depression    Diarrhea, unspecified    Esophageal reflux    Fatigue    Feeling lonely    Flatulence/gas pain/belching    Food intolerance    Milk    Frequent use of laxatives    Heart palpitations    Heartburn    Hemorrhoids    High cholesterol    History of depression    Hyperlipidemia    Night sweats    Pain in joints    Rash    Stress    Wears glasses    Weight gain      Past Surgical History:   Procedure Laterality Date    Back surgery  2017    Colonoscopy  7/25/14    Dr. Díaz    Colonoscopy      Hernia surgery  2017    Tonsillectomy  1972      Family History   Problem Relation Age of Onset    Ulcerative Colitis Father     Bleeding Disorders Father     Asthma Mother     Colon Polyps Mother     Other (Other) Daughter         add    Asthma Son     Other (Other) Son         add     Cancer Maternal Grandfather     Colon Cancer Maternal Grandfather     Asthma Paternal Grandfather     Bipolar Disorder Sister     Psychiatric Sister     Mental Disorder Sister     Colon Cancer Maternal Uncle     Prostate Cancer Maternal Uncle     Prostate Cancer Cousin       Social History:  Social History     Socioeconomic History    Marital status:    Tobacco Use    Smoking status: Never    Smokeless tobacco: Never   Vaping Use    Vaping status: Never Used   Substance and Sexual Activity    Alcohol use: No    Drug use: No   Other Topics Concern    Caffeine Concern No    Exercise No    Seat Belt No    Special Diet No    Stress Concern Yes    Weight Concern No      Occ: yes. : yes. Children: yes.   Exercise: once per week,  twice per week.  Diet: watches fats closely and watches sugar closely     REVIEW OF SYSTEMS:   A comprehensive 10 point review of systems was completed.     Pertinent positives and negatives noted in the HPI.      EXAM:   /78   Pulse 73   Temp 97.5 °F (36.4 °C)   Resp 16   Ht 5' 8\" (1.727 m)   Wt 188 lb (85.3 kg)   SpO2 99%   BMI 28.59 kg/m²   Body mass index is 28.59 kg/m².   GENERAL: well developed, well nourished,in no apparent distress  SKIN: no rashes,no suspicious lesions  HEENT: atraumatic, normocephalic,ears and throat are clear, right cerumen impaction  EYES:PERRLA, EOMI,conjunctiva are clear  NECK: supple,no adenopathy,no bruits  LUNGS: clear to auscultation  CARDIO: RRR without murmur  GI: good BS's,no masses, HSM or tenderness  : deferred  MUSCULOSKELETAL: back is not tender  EXTREMITIES: no cyanosis, clubbing or edema  NEURO: Oriented times three,motor and sensory are grossly intact, abnormal liliana sumner pike with right    ASSESSMENT AND PLAN:   Terence Reid is a 59 year old male who presents for a complete physical exam. t is Body mass index is 28.59 kg/m²., recommended low fat diet and aerobic exercise 30 minutes three times weekly.   Health  maintenance, will check fasting Lipids, CMP, CBC and PSA.    Cerumen Removal Procedure  Patient gave verbal consent.  Risks and Benefits of removal were discussed with the patient, who agreed to proceed with procedure.  Right Ear   Indication TM not visible  Prep Hydrogen Peroxide  Otoscope visualization of cerumen and the curette was used to remove the wax  Patient Status Tolerated Well  No complications    Treat BPPV with pred burst  UTD with screening colonoscopy.   Pt info handouts given for: exercise, low fat diet,   The patient indicates understanding of these issues and agrees to the plan.  The patient is asked to return for CPX in 12m .    History/Other:   Review of Systems  Current Outpatient Medications   Medication Sig Dispense Refill    hydrocortisone (PROCTOSOL HC) 2.5 % External Cream Place 1 Application  rectally 2 (two) times daily. 30 g 0    naproxen 500 MG Oral Tab Take 1 tablet (500 mg total) by mouth 2 (two) times daily with meals. 28 tablet 0    simvastatin 20 MG Oral Tab Take 1 tablet (20 mg total) by mouth nightly. 90 tablet 3    escitalopram 10 MG Oral Tab Take 1 tablet (10 mg total) by mouth daily.      omeprazole (PRILOSEC) 20 MG Oral Capsule Delayed Release Take 1 capsule (20 mg total) by mouth every morning before breakfast. As needed      LORazepam (ATIVAN) 2 MG Oral Tab Take 1 tablet by mouth 3 (three) times daily as needed for Anxiety. 90 tablet 1     Allergies:Allergies[1]    Objective:   Physical Exam    Assessment & Plan:   1. Annual physical exam    2. Prostate cancer screening    3. Routine general medical examination at a health care facility        Orders Placed This Encounter   Procedures    CBC With Differential With Platelet    Comp Metabolic Panel (14)    Lipid Panel    TSH W Reflex To Free T4    Urinalysis, Routine    PSA Total, Screen       Meds This Visit:  Requested Prescriptions      No prescriptions requested or ordered in this encounter       Imaging &  Referrals:  None         [1] No Known Allergies

## 2024-11-20 DIAGNOSIS — H81.11 BENIGN PAROXYSMAL POSITIONAL VERTIGO OF RIGHT EAR: ICD-10-CM

## 2024-11-20 DIAGNOSIS — K64.0 GRADE I HEMORRHOIDS: ICD-10-CM

## 2024-11-20 DIAGNOSIS — E78.2 MIXED HYPERLIPIDEMIA: ICD-10-CM

## 2024-11-20 RX ORDER — SIMVASTATIN 20 MG
20 TABLET ORAL NIGHTLY
Qty: 90 TABLET | Refills: 0 | Status: SHIPPED | OUTPATIENT
Start: 2024-11-20

## 2024-11-20 RX ORDER — PREDNISONE 20 MG/1
TABLET ORAL
Qty: 20 TABLET | Refills: 0 | Status: SHIPPED | OUTPATIENT
Start: 2024-11-20

## 2024-11-20 RX ORDER — HYDROCORTISONE 25 MG/G
1 CREAM TOPICAL 2 TIMES DAILY
Qty: 30 G | Refills: 0 | Status: SHIPPED | OUTPATIENT
Start: 2024-11-20

## 2024-11-20 RX ORDER — ESCITALOPRAM OXALATE 10 MG/1
10 TABLET ORAL DAILY
Qty: 30 TABLET | Refills: 3 | Status: SHIPPED | OUTPATIENT
Start: 2024-11-20

## 2024-11-20 NOTE — TELEPHONE ENCOUNTER
predniSONE 20 MG Oral Tab   Last time medication was refilled 11/12/2024  Last office visit  11/12/2024  Next office visit due/scheduled   Future Appointments   Date Time Provider Department Center   11/11/2025  9:30 AM Wes Shelton MD [facility]     MyChart message sent to confirm refill request.     escitalopram 10 MG Oral Tab   Last time medication was refilled 08/26/2019  Last office visit  11/12/2024  Next office visit due/scheduled   Future Appointments   Date Time Provider Department Center   11/11/2025  9:30 AM Wes Shelton MD [facility]     MyChart message sent to patient to confirm refill request.

## 2024-11-20 NOTE — TELEPHONE ENCOUNTER
hydrocortisone (PROCTOSOL HC) 2.5 % External Cream   Last time medication was refilled 07/25/2024  Last office visit  11/12/2024  Next office visit due/scheduled   Future Appointments   Date Time Provider Department Center   11/11/2025  9:30 AM Wes Shelton MD [facility]     Medication not on protocol, routed to Renae Rushing Nurse Practitioner.       simvastatin 20 MG Oral Tab   Last time medication was refilled 07/25/2024  Last office visit  11/12/2024  Next office visit due/scheduled   Future Appointments   Date Time Provider Department Center   11/11/2025  9:30 AM Wes Shelton MD [facility]     Medication passed protocol, refill sent.

## 2024-11-21 RX ORDER — ESCITALOPRAM OXALATE 10 MG/1
10 TABLET ORAL DAILY
Qty: 90 TABLET | Refills: 1 | OUTPATIENT
Start: 2024-11-21

## 2024-12-03 ENCOUNTER — OFFICE VISIT (OUTPATIENT)
Dept: INTERNAL MEDICINE CLINIC | Facility: CLINIC | Age: 59
End: 2024-12-03
Payer: COMMERCIAL

## 2024-12-03 VITALS
SYSTOLIC BLOOD PRESSURE: 128 MMHG | RESPIRATION RATE: 16 BRPM | OXYGEN SATURATION: 99 % | BODY MASS INDEX: 28.49 KG/M2 | HEART RATE: 78 BPM | TEMPERATURE: 98 F | DIASTOLIC BLOOD PRESSURE: 82 MMHG | HEIGHT: 68 IN | WEIGHT: 188 LBS

## 2024-12-03 DIAGNOSIS — H81.13 BENIGN PAROXYSMAL POSITIONAL VERTIGO DUE TO BILATERAL VESTIBULAR DISORDER: Primary | ICD-10-CM

## 2024-12-03 DIAGNOSIS — R42 DIZZY: ICD-10-CM

## 2024-12-03 PROCEDURE — 99214 OFFICE O/P EST MOD 30 MIN: CPT | Performed by: INTERNAL MEDICINE

## 2024-12-03 RX ORDER — PREDNISONE 20 MG/1
TABLET ORAL
Qty: 10 TABLET | Refills: 0 | Status: SHIPPED | OUTPATIENT
Start: 2024-12-03

## 2024-12-03 NOTE — PROGRESS NOTES
Subjective:   Patient ID: Terence Reid is a 59 year old male.    Vertigo  This is a recurrent problem. The current episode started more than 1 month ago. The problem occurs 2 to 4 times per day. The problem has been unchanged. Associated symptoms include headaches and vertigo. Pertinent negatives include no arthralgias, chills, congestion, fatigue, fever, joint swelling, neck pain, swollen glands, visual change or vomiting. The symptoms are aggravated by bending (head movement). He has tried rest (prednisone burst) for the symptoms. The treatment provided mild relief.       History/Other:   Review of Systems   Constitutional:  Negative for chills, fatigue and fever.   HENT:  Negative for congestion.    Gastrointestinal:  Negative for vomiting.   Musculoskeletal:  Negative for arthralgias, joint swelling and neck pain.   Neurological:  Positive for vertigo and headaches.   All other systems reviewed and are negative.    Current Outpatient Medications   Medication Sig Dispense Refill    predniSONE 20 MG Oral Tab Take 2 tablets by mouth daily x 5 days 10 tablet 0    escitalopram 10 MG Oral Tab Take 1 tablet (10 mg total) by mouth daily. 30 tablet 3    hydrocortisone (PROCTOSOL HC) 2.5 % External Cream Place 1 Application  rectally 2 (two) times daily. 30 g 0    simvastatin 20 MG Oral Tab Take 1 tablet (20 mg total) by mouth nightly. 90 tablet 0    fluticasone propionate 50 MCG/ACT Nasal Suspension 2 sprays by Each Nare route daily. 3 each 1    naproxen 500 MG Oral Tab Take 1 tablet (500 mg total) by mouth 2 (two) times daily with meals. 28 tablet 0    omeprazole (PRILOSEC) 20 MG Oral Capsule Delayed Release Take 1 capsule (20 mg total) by mouth every morning before breakfast. As needed      LORazepam (ATIVAN) 2 MG Oral Tab Take 1 tablet by mouth 3 (three) times daily as needed for Anxiety. 90 tablet 1    predniSONE 20 MG Oral Tab Take 2 tablets by mouth daily x 10 days (Patient not taking: Reported on  12/3/2024) 20 tablet 0     Allergies:Allergies[1]    Objective:   Physical Exam  Vitals and nursing note reviewed.   Constitutional:       General: He is not in acute distress.     Appearance: Normal appearance.   HENT:      Head: Normocephalic and atraumatic.      Right Ear: Tympanic membrane and ear canal normal.      Left Ear: Tympanic membrane and ear canal normal.   Eyes:      General: No visual field deficit.     Extraocular Movements:      Right eye: Nystagmus present. Normal extraocular motion.      Left eye: Nystagmus present. Normal extraocular motion.   Cardiovascular:      Rate and Rhythm: Normal rate and regular rhythm.      Heart sounds: Normal heart sounds.   Pulmonary:      Effort: Pulmonary effort is normal.      Breath sounds: Normal breath sounds.   Musculoskeletal:         General: Normal range of motion.   Neurological:      General: No focal deficit present.      Mental Status: He is alert and oriented to person, place, and time.      Cranial Nerves: No cranial nerve deficit.      Comments: Abnormal liliana sumner pike           Assessment & Plan:   1. Benign paroxysmal positional vertigo due to bilateral vestibular disorder    2. Dizzy      - finish pred burst  - check MRI brain as his sxs are recurrent  - refer to PT   No orders of the defined types were placed in this encounter.      Meds This Visit:  Requested Prescriptions     Signed Prescriptions Disp Refills    predniSONE 20 MG Oral Tab 10 tablet 0     Sig: Take 2 tablets by mouth daily x 5 days       Imaging & Referrals:  MRI IACS (W+WO) (CPT=70553)         [1] No Known Allergies

## 2024-12-05 ENCOUNTER — TELEPHONE (OUTPATIENT)
Dept: INTERNAL MEDICINE CLINIC | Facility: CLINIC | Age: 59
End: 2024-12-05

## 2024-12-05 NOTE — TELEPHONE ENCOUNTER
MRI pituitary completed through [facility] reviewed by YEFRI Macdonald. \"Normal MRI. Vertigo symptoms not improved would recommend Patient and ENT consult\".    MRI placed to scan. Detailed MyChart message sent to patient.

## 2025-06-24 DIAGNOSIS — H81.11 BENIGN PAROXYSMAL POSITIONAL VERTIGO OF RIGHT EAR: ICD-10-CM

## 2025-06-24 RX ORDER — PREDNISONE 20 MG/1
TABLET ORAL
Qty: 20 TABLET | Refills: 0 | Status: SHIPPED | OUTPATIENT
Start: 2025-06-24

## 2025-06-24 NOTE — TELEPHONE ENCOUNTER
Last time medication was refilled 11/20/24  Last office visit  12/3/24  Next office visit due/scheduled   Future Appointments   Date Time Provider Department Center   11/11/2025  9:30 AM Wes Shelton MD [facility]     Medication not on protocol.  Patient comment: To control/reduce vertigo symptoms. If possible I'd like the generic of this medication. Thank you.

## 2025-07-17 DIAGNOSIS — E78.2 MIXED HYPERLIPIDEMIA: ICD-10-CM

## 2025-07-17 RX ORDER — SIMVASTATIN 20 MG
20 TABLET ORAL NIGHTLY
Qty: 90 TABLET | Refills: 0 | Status: SHIPPED | OUTPATIENT
Start: 2025-07-17

## 2025-07-17 NOTE — TELEPHONE ENCOUNTER
Last time medication was refilled 11/20/2024  Last office visit  12/03/2024  Next office visit due/scheduled   Future Appointments   Date Time Provider Department Center   11/11/2025  9:30 AM Wes Shelton MD [facility]             Passed protocol, Medication sent.

## (undated) NOTE — Clinical Note
Jl Pittman, Can you please review patients CT abd/pelvis. I discussed results with Dr. Tyler Thompson and we have started Cipro and Flagyl for diverticulitis. I have also asked the patient to reach out to your office for follow-up appt. Thank you!  Mitesh Ko,